# Patient Record
Sex: MALE | Race: WHITE | Employment: FULL TIME | URBAN - METROPOLITAN AREA
[De-identification: names, ages, dates, MRNs, and addresses within clinical notes are randomized per-mention and may not be internally consistent; named-entity substitution may affect disease eponyms.]

---

## 2017-02-11 ENCOUNTER — HOSPITAL ENCOUNTER (OUTPATIENT)
Age: 62
Discharge: HOME | End: 2017-02-11
Payer: COMMERCIAL

## 2017-02-11 LAB
ALBUMIN SERPL-MCNC: 4.2 G/DL (ref 3.5–5.2)
ALBUMIN/GLOBULIN RATIO: ABNORMAL (ref 1–2.5)
ALP BLD-CCNC: 60 U/L (ref 40–129)
ALT SERPL-CCNC: 21 U/L (ref 5–41)
ANION GAP SERPL CALCULATED.3IONS-SCNC: 15 MMOL/L (ref 9–17)
AST SERPL-CCNC: 16 U/L
BILIRUB SERPL-MCNC: 0.48 MG/DL (ref 0.3–1.2)
BILIRUBIN URINE: NEGATIVE
BUN BLDV-MCNC: 15 MG/DL (ref 6–20)
BUN/CREAT BLD: ABNORMAL (ref 9–20)
CALCIUM SERPL-MCNC: 9.3 MG/DL (ref 8.6–10.4)
CHLORIDE BLD-SCNC: 99 MMOL/L (ref 98–107)
CHOLESTEROL/HDL RATIO: 4.1
CHOLESTEROL: 176 MG/DL
CO2: 24 MMOL/L (ref 20–31)
COLOR: YELLOW
COMMENT UA: ABNORMAL
CREAT SERPL-MCNC: 0.65 MG/DL (ref 0.7–1.2)
CREATININE URINE: 315.9 MG/DL (ref 39–259)
GFR AFRICAN AMERICAN: >60 ML/MIN
GFR NON-AFRICAN AMERICAN: >60 ML/MIN
GFR SERPL CREATININE-BSD FRML MDRD: ABNORMAL ML/MIN/{1.73_M2}
GFR SERPL CREATININE-BSD FRML MDRD: ABNORMAL ML/MIN/{1.73_M2}
GLUCOSE BLD-MCNC: 245 MG/DL (ref 70–99)
GLUCOSE URINE: ABNORMAL
HCT VFR BLD CALC: 43.6 % (ref 41–53)
HDLC SERPL-MCNC: 43 MG/DL
HEMOGLOBIN: 14.8 G/DL (ref 13.5–17.5)
KETONES, URINE: NEGATIVE
LDL CHOLESTEROL: 99 MG/DL (ref 0–130)
LEUKOCYTE ESTERASE, URINE: NEGATIVE
MCH RBC QN AUTO: 29.6 PG (ref 26–34)
MCHC RBC AUTO-ENTMCNC: 34 G/DL (ref 31–37)
MCV RBC AUTO: 87.2 FL (ref 80–100)
MICROALBUMIN/CREAT 24H UR: 15 MG/L
MICROALBUMIN/CREAT UR-RTO: 5 MCG/MG CREAT
NITRITE, URINE: NEGATIVE
PDW BLD-RTO: 12.5 % (ref 11.5–14.9)
PH UA: 5 (ref 5–8)
PLATELET # BLD: 215 K/UL (ref 150–450)
PMV BLD AUTO: 7.6 FL (ref 6–12)
POTASSIUM SERPL-SCNC: 4 MMOL/L (ref 3.7–5.3)
PROTEIN UA: NEGATIVE
RBC # BLD: 5 M/UL (ref 4.5–5.9)
SODIUM BLD-SCNC: 138 MMOL/L (ref 135–144)
SPECIFIC GRAVITY UA: 1.04 (ref 1–1.03)
TOTAL PROTEIN: 7.2 G/DL (ref 6.4–8.3)
TRIGL SERPL-MCNC: 169 MG/DL
TURBIDITY: CLEAR
URINE HGB: NEGATIVE
UROBILINOGEN, URINE: NORMAL
VLDLC SERPL CALC-MCNC: ABNORMAL MG/DL (ref 1–30)
WBC # BLD: 6.6 K/UL (ref 3.5–11)

## 2017-02-11 PROCEDURE — 82570 ASSAY OF URINE CREATININE: CPT

## 2017-02-11 PROCEDURE — 81003 URINALYSIS AUTO W/O SCOPE: CPT

## 2017-02-11 PROCEDURE — 85027 COMPLETE CBC AUTOMATED: CPT

## 2017-02-11 PROCEDURE — 36415 COLL VENOUS BLD VENIPUNCTURE: CPT

## 2017-02-11 PROCEDURE — 80061 LIPID PANEL: CPT

## 2017-02-11 PROCEDURE — 80053 COMPREHEN METABOLIC PANEL: CPT

## 2017-02-11 PROCEDURE — 83036 HEMOGLOBIN GLYCOSYLATED A1C: CPT

## 2017-02-11 PROCEDURE — 82043 UR ALBUMIN QUANTITATIVE: CPT

## 2017-02-12 LAB
ESTIMATED AVERAGE GLUCOSE: 269 MG/DL
HBA1C MFR BLD: 11 % (ref 4–6)

## 2017-10-12 ENCOUNTER — OFFICE VISIT (OUTPATIENT)
Dept: PODIATRY | Age: 62
End: 2017-10-12
Payer: COMMERCIAL

## 2017-10-12 VITALS
WEIGHT: 242 LBS | DIASTOLIC BLOOD PRESSURE: 90 MMHG | HEART RATE: 64 BPM | BODY MASS INDEX: 34.65 KG/M2 | HEIGHT: 70 IN | SYSTOLIC BLOOD PRESSURE: 144 MMHG

## 2017-10-12 DIAGNOSIS — G57.81 NEUROMA DIGITAL NERVE, RIGHT: ICD-10-CM

## 2017-10-12 DIAGNOSIS — M77.51 CAPSULITIS OF METATARSOPHALANGEAL (MTP) JOINT OF RIGHT FOOT: Primary | ICD-10-CM

## 2017-10-12 DIAGNOSIS — R60.0 EDEMA OF RIGHT FOOT: ICD-10-CM

## 2017-10-12 DIAGNOSIS — M79.671 RIGHT FOOT PAIN: ICD-10-CM

## 2017-10-12 PROCEDURE — 73630 X-RAY EXAM OF FOOT: CPT | Performed by: PODIATRIST

## 2017-10-12 PROCEDURE — L3020 FOOT LONGITUD/METATARSAL SUP: HCPCS | Performed by: PODIATRIST

## 2017-10-12 PROCEDURE — 99213 OFFICE O/P EST LOW 20 MIN: CPT | Performed by: PODIATRIST

## 2017-10-12 RX ORDER — PROPRANOLOL HYDROCHLORIDE 120 MG/1
CAPSULE, EXTENDED RELEASE ORAL
COMMUNITY
Start: 2017-09-24

## 2017-10-12 ASSESSMENT — ENCOUNTER SYMPTOMS
NAUSEA: 0
COLOR CHANGE: 0
SHORTNESS OF BREATH: 0
BACK PAIN: 0
DIARRHEA: 0

## 2017-10-12 NOTE — PROGRESS NOTES
Subjective: Ifeanyi Hitchcock 63 y.o. male that presents with chief complaint of pain to the ball and toes of the right foot. Chief Complaint   Patient presents with    Foot Pain     B/L foot pain,     Other     Would like new orthotics/ casted for custom molded orthotics     Patient states that he had surgery on some hammertoes a few years ago and his foot was fine until the last few months. Patient states that he has orthotics, but they have not been fitting well and have been causing his feet to slide out. Patient states that they are also falling apart and he has had to use duct tape to fix them. Pain is rated 7 out of 10 and is described as intermittent. Patient also relates a little pain to the left midfoot, but denies any injury to this foot. Review of Systems   Constitutional: Negative for activity change, appetite change, chills, diaphoresis, fatigue and fever. Respiratory: Negative for shortness of breath. Cardiovascular: Negative for leg swelling. Gastrointestinal: Negative for diarrhea and nausea. Endocrine: Negative for cold intolerance, heat intolerance and polyuria. Musculoskeletal: Positive for arthralgias. Negative for back pain, gait problem, joint swelling and myalgias. Skin: Negative for color change, pallor, rash and wound. Allergic/Immunologic: Negative for environmental allergies and food allergies. Neurological: Negative for dizziness, weakness, light-headedness and numbness. Hematological: Does not bruise/bleed easily. Psychiatric/Behavioral: Negative for behavioral problems, confusion and self-injury. The patient is not nervous/anxious. Objective: Clinical evaluation of the patient reveals pain with palpation to the second, third, and fourth metatarsophalangeal joints of the right foot. This pain is greatest to the third metatarsophalangeal joint. There is pain with palpation to the second and third intermetatarsal spaces of the right foot.   There is no palpable click noted to either intermetatarsal space. There is mild divergence of the second and third toes of the right foot. There is mild edema present to the ball of the right foot. There is no erythema, calor, or open lesion noted to the right foot. X-ray's taken: AP, Lateral, and Medial Oblique of the right foot. Findings: There is no fracture or stress fracture noted to the foot. Assessment:   1. Capsulitis of metatarsophalangeal (MTP) joint of right foot  XR FOOT RIGHT (MIN 3 VIEWS)    NC FOOT LONGITUD/METATARSAL SUP   2. Neuroma digital nerve, right  XR FOOT RIGHT (MIN 3 VIEWS)    NC FOOT LONGITUD/METATARSAL SUP   3. Edema of right foot  XR FOOT RIGHT (MIN 3 VIEWS)    NC FOOT LONGITUD/METATARSAL SUP   4. Right foot pain  XR FOOT RIGHT (MIN 3 VIEWS)    NC FOOT LONGITUD/METATARSAL SUP         Plan: 1. Clinical evaluation of the patient. 2.  Patient casted today for custom molded orthotics. I recommended a steroid injection to the patient's right foot, but the patient declines at this time. Therefore, the patient was encouraged to use compression and ice to the foot. 3. Return if symptoms worsen or fail to improve, for 2 weeks after getting custom orthotics.    10/12/2017      Emerita Boudreaux DPM

## 2017-11-15 ENCOUNTER — TELEPHONE (OUTPATIENT)
Dept: PODIATRY | Age: 62
End: 2017-11-15

## 2017-11-17 ENCOUNTER — NURSE ONLY (OUTPATIENT)
Dept: PODIATRY | Age: 62
End: 2017-11-17

## 2017-11-17 DIAGNOSIS — G57.81 NEUROMA DIGITAL NERVE, RIGHT: ICD-10-CM

## 2017-11-17 DIAGNOSIS — M79.671 RIGHT FOOT PAIN: ICD-10-CM

## 2017-11-17 DIAGNOSIS — M77.51 CAPSULITIS OF METATARSOPHALANGEAL (MTP) JOINT OF RIGHT FOOT: Primary | ICD-10-CM

## 2017-11-17 DIAGNOSIS — R60.0 EDEMA OF RIGHT FOOT: ICD-10-CM

## 2018-11-29 ENCOUNTER — HOSPITAL ENCOUNTER (OUTPATIENT)
Age: 63
Discharge: HOME | End: 2018-11-29
Payer: COMMERCIAL

## 2018-11-29 LAB
ANION GAP SERPL CALCULATED.3IONS-SCNC: 12 MMOL/L (ref 9–17)
BUN BLDV-MCNC: 17 MG/DL (ref 8–23)
BUN/CREAT BLD: ABNORMAL (ref 9–20)
CALCIUM SERPL-MCNC: 9.3 MG/DL (ref 8.6–10.4)
CHLORIDE BLD-SCNC: 98 MMOL/L (ref 98–107)
CO2: 25 MMOL/L (ref 20–31)
CREAT SERPL-MCNC: 0.74 MG/DL (ref 0.7–1.2)
GFR AFRICAN AMERICAN: >60 ML/MIN
GFR NON-AFRICAN AMERICAN: >60 ML/MIN
GFR SERPL CREATININE-BSD FRML MDRD: ABNORMAL ML/MIN/{1.73_M2}
GFR SERPL CREATININE-BSD FRML MDRD: ABNORMAL ML/MIN/{1.73_M2}
GLUCOSE BLD-MCNC: 218 MG/DL (ref 70–99)
POTASSIUM SERPL-SCNC: 4.1 MMOL/L (ref 3.7–5.3)
SODIUM BLD-SCNC: 135 MMOL/L (ref 135–144)

## 2018-11-29 PROCEDURE — 80048 BASIC METABOLIC PNL TOTAL CA: CPT

## 2018-11-29 PROCEDURE — 36415 COLL VENOUS BLD VENIPUNCTURE: CPT

## 2019-10-12 ENCOUNTER — HOSPITAL ENCOUNTER (OUTPATIENT)
Age: 64
Discharge: HOME | End: 2019-10-12
Payer: COMMERCIAL

## 2019-10-12 ENCOUNTER — HOSPITAL ENCOUNTER (OUTPATIENT)
Age: 64
Discharge: HOME | End: 2019-10-14
Payer: COMMERCIAL

## 2019-10-12 ENCOUNTER — HOSPITAL ENCOUNTER (OUTPATIENT)
Dept: GENERAL RADIOLOGY | Age: 64
Discharge: HOME | End: 2019-10-14
Payer: COMMERCIAL

## 2019-10-12 DIAGNOSIS — M25.569 KNEE PAIN, UNSPECIFIED CHRONICITY, UNSPECIFIED LATERALITY: ICD-10-CM

## 2019-10-12 LAB
ANION GAP SERPL CALCULATED.3IONS-SCNC: 12 MMOL/L (ref 9–17)
BUN BLDV-MCNC: 21 MG/DL (ref 8–23)
BUN/CREAT BLD: ABNORMAL (ref 9–20)
CALCIUM SERPL-MCNC: 9.9 MG/DL (ref 8.6–10.4)
CHLORIDE BLD-SCNC: 101 MMOL/L (ref 98–107)
CO2: 28 MMOL/L (ref 20–31)
CREAT SERPL-MCNC: 0.82 MG/DL (ref 0.7–1.2)
GFR AFRICAN AMERICAN: >60 ML/MIN
GFR NON-AFRICAN AMERICAN: >60 ML/MIN
GFR SERPL CREATININE-BSD FRML MDRD: ABNORMAL ML/MIN/{1.73_M2}
GFR SERPL CREATININE-BSD FRML MDRD: ABNORMAL ML/MIN/{1.73_M2}
GLUCOSE BLD-MCNC: 162 MG/DL (ref 70–99)
POTASSIUM SERPL-SCNC: 4.5 MMOL/L (ref 3.7–5.3)
SODIUM BLD-SCNC: 141 MMOL/L (ref 135–144)

## 2019-10-12 PROCEDURE — 73562 X-RAY EXAM OF KNEE 3: CPT

## 2019-10-12 PROCEDURE — 36415 COLL VENOUS BLD VENIPUNCTURE: CPT

## 2019-10-12 PROCEDURE — 80048 BASIC METABOLIC PNL TOTAL CA: CPT

## 2022-02-16 ENCOUNTER — HOSPITAL ENCOUNTER (OUTPATIENT)
Age: 67
Discharge: HOME | End: 2022-02-16
Payer: COMMERCIAL

## 2022-02-16 LAB
ANION GAP SERPL CALCULATED.3IONS-SCNC: 11 MMOL/L (ref 9–17)
BUN BLDV-MCNC: 21 MG/DL (ref 8–23)
BUN/CREAT BLD: ABNORMAL (ref 9–20)
CALCIUM SERPL-MCNC: 9.7 MG/DL (ref 8.6–10.4)
CHLORIDE BLD-SCNC: 100 MMOL/L (ref 98–107)
CO2: 26 MMOL/L (ref 20–31)
CREAT SERPL-MCNC: 0.9 MG/DL (ref 0.7–1.2)
GFR AFRICAN AMERICAN: >60 ML/MIN
GFR NON-AFRICAN AMERICAN: >60 ML/MIN
GFR SERPL CREATININE-BSD FRML MDRD: ABNORMAL ML/MIN/{1.73_M2}
GFR SERPL CREATININE-BSD FRML MDRD: ABNORMAL ML/MIN/{1.73_M2}
GLUCOSE BLD-MCNC: 266 MG/DL (ref 70–99)
POTASSIUM SERPL-SCNC: 4.1 MMOL/L (ref 3.7–5.3)
SODIUM BLD-SCNC: 137 MMOL/L (ref 135–144)

## 2022-02-16 PROCEDURE — 36415 COLL VENOUS BLD VENIPUNCTURE: CPT

## 2022-02-16 PROCEDURE — 80048 BASIC METABOLIC PNL TOTAL CA: CPT

## 2023-10-31 ENCOUNTER — OFFICE VISIT (OUTPATIENT)
Age: 68
End: 2023-10-31
Payer: COMMERCIAL

## 2023-10-31 VITALS
HEART RATE: 66 BPM | BODY MASS INDEX: 37.81 KG/M2 | SYSTOLIC BLOOD PRESSURE: 146 MMHG | HEIGHT: 70 IN | DIASTOLIC BLOOD PRESSURE: 76 MMHG | WEIGHT: 264.1 LBS

## 2023-10-31 DIAGNOSIS — M48.062 LUMBAR STENOSIS WITH NEUROGENIC CLAUDICATION: Primary | ICD-10-CM

## 2023-10-31 DIAGNOSIS — M71.38 SYNOVIAL CYST OF LUMBAR SPINE: ICD-10-CM

## 2023-10-31 PROCEDURE — 99214 OFFICE O/P EST MOD 30 MIN: CPT | Performed by: NEUROLOGICAL SURGERY

## 2023-10-31 PROCEDURE — 3077F SYST BP >= 140 MM HG: CPT | Performed by: NEUROLOGICAL SURGERY

## 2023-10-31 PROCEDURE — G8427 DOCREV CUR MEDS BY ELIG CLIN: HCPCS | Performed by: NEUROLOGICAL SURGERY

## 2023-10-31 PROCEDURE — 3078F DIAST BP <80 MM HG: CPT | Performed by: NEUROLOGICAL SURGERY

## 2023-10-31 PROCEDURE — 3017F COLORECTAL CA SCREEN DOC REV: CPT | Performed by: NEUROLOGICAL SURGERY

## 2023-10-31 PROCEDURE — G8417 CALC BMI ABV UP PARAM F/U: HCPCS | Performed by: NEUROLOGICAL SURGERY

## 2023-10-31 PROCEDURE — 1036F TOBACCO NON-USER: CPT | Performed by: NEUROLOGICAL SURGERY

## 2023-10-31 PROCEDURE — 1123F ACP DISCUSS/DSCN MKR DOCD: CPT | Performed by: NEUROLOGICAL SURGERY

## 2023-10-31 PROCEDURE — G8484 FLU IMMUNIZE NO ADMIN: HCPCS | Performed by: NEUROLOGICAL SURGERY

## 2023-10-31 RX ORDER — GLIPIZIDE 5 MG/1
TABLET, FILM COATED, EXTENDED RELEASE ORAL
COMMUNITY

## 2023-10-31 RX ORDER — GABAPENTIN 100 MG/1
CAPSULE ORAL
COMMUNITY
Start: 2023-10-25

## 2023-10-31 RX ORDER — LOSARTAN POTASSIUM 100 MG/1
1 TABLET ORAL DAILY
COMMUNITY

## 2023-10-31 RX ORDER — OXYCODONE HYDROCHLORIDE AND ACETAMINOPHEN 5; 325 MG/1; MG/1
TABLET ORAL
COMMUNITY
Start: 2023-10-23

## 2023-10-31 RX ORDER — ASPIRIN 325 MG/1
TABLET, FILM COATED ORAL
COMMUNITY
Start: 2023-04-10

## 2023-10-31 RX ORDER — PIOGLITAZONEHYDROCHLORIDE 30 MG/1
1 TABLET ORAL
COMMUNITY
Start: 2022-07-07

## 2023-10-31 RX ORDER — MAGNESIUM 30 MG
30 TABLET ORAL DAILY PRN
COMMUNITY

## 2023-10-31 NOTE — PROGRESS NOTES
[facility] NEUROSURGERY  [address] [place] [address]  Dept: [phone]  Dept Fax: [phone]     Patient:  Odilon Braswell  YOB: 1955  Date: 10/31/23      Chief Complaint   Patient presents with    Back Pain     Radiating into legs; review MRI lumbar           HPI:     Mr. Nancy Watt returns to the office today for further evaluation of lower back pain that radiates in the lower extremities. I had originally seen him in August 2021 as a new patient. At that time he had been sent in for evaluation after having an episode of pain and having imaging that demonstrated significant degenerative findings. By the time of his appointment in the office, however, his symptoms had essentially resolved. At that time we did not pursue any surgical treatment. He continued to do fairly well until this past August.  He had a bit of a flareup of pain in the back that settled down after a few weeks, but then became worse again in September. He has been having fairly persistent pain for about 1 month now. He feels that the pain was worse at onset, and that it has improved some now, but he has not been up and moving very much. It sounds as if he spends most of the day sitting in a chair. He has not yet had any physical therapy since this new episode began. He did try an injection at his primary care provider's office, which I suspect was probably a steroid. He has also been prescribed gabapentin and Percocet. The pain tends to be worse with standing and walking and relieved with rest.  He describes the pain starting in the lower back and radiating around into the thighs. Also he will have difficulty with pain around the tailbone. He has not noticed any difficulty with bowel or bladder dysfunction.         Physical Exam:      BP (!) 146/76   Pulse 66   Ht 1.778 m (5' 10\")   Wt 119.8 kg (264 lb

## 2023-11-01 ENCOUNTER — TELEPHONE (OUTPATIENT)
Age: 68
End: 2023-11-01

## 2023-11-01 NOTE — TELEPHONE ENCOUNTER
You saw pt 10/31/2023 and you referred him to physical therapy. He decided he no longer want to try PT, he wants to schedule surgery. If this is ok you'll need to do an addendum to our office note stating what surgery you would be doing.

## 2023-11-02 NOTE — TELEPHONE ENCOUNTER
I agree that surgery is a good option for him, but we have had a couple of patients have surgery for this reason denied or delayed by their insurance company because they had not had recent physical therapy.  I think he should go ahead with the PT and we can plan to see him in a few weeks to check his progress.  If he is not responding to therapy we will move ahead with surgery at that time, and this is likely the most expeditious way to get him scheduled.

## 2023-11-07 ENCOUNTER — HOSPITAL ENCOUNTER (OUTPATIENT)
Dept: PHYSICAL THERAPY | Age: 68
Setting detail: THERAPIES SERIES
Discharge: HOME | End: 2023-11-07
Attending: NEUROLOGICAL SURGERY
Payer: COMMERCIAL

## 2023-11-07 PROCEDURE — 97162 PT EVAL MOD COMPLEX 30 MIN: CPT

## 2023-11-07 PROCEDURE — 97110 THERAPEUTIC EXERCISES: CPT

## 2023-11-07 PROCEDURE — 97163 PT EVAL HIGH COMPLEX 45 MIN: CPT

## 2023-11-09 ENCOUNTER — HOSPITAL ENCOUNTER (OUTPATIENT)
Dept: PHYSICAL THERAPY | Age: 68
Setting detail: THERAPIES SERIES
Discharge: HOME | End: 2023-11-09
Attending: NEUROLOGICAL SURGERY
Payer: COMMERCIAL

## 2023-11-09 PROCEDURE — 97113 AQUATIC THERAPY/EXERCISES: CPT

## 2023-11-09 NOTE — FLOWSHEET NOTE
[x] [facility] ([facility]) - [facility] & Therapy  [address]  [place]: [phone]   F: [phone]    Physical Therapy Daily Treatment Note    Date:  2023  Patient Name:  Arlette Barbosa    :    MRN: [id number]  Physician: Dipak Herrera MD                      Insurance: [id number] DUARTE ( visits remaining)  Medical Diagnosis: A72.825 (ICD-10-CM) - Lumbar stenosis with neurogenic claudication      Rehab Codes: R25 pain , M25.60 stiffness, R53.1 weakness   Onset Date: 23                 Next 's appt: 23-neurosurgery  Visit# / total visits: 2/10  Cancels/No Shows: 0/0    Subjective:  Pt reports increased pain this morning. States tailbone is the most painful in R buttocks and pain in Right lateral knee. Notes today is a high pain day. Was feeling better earlier this week and woke up in pain. Pain:  [x] Yes  [] No Location: tailbone and into R buttocks and at right knee   Pain Rating: (0-10 scale) 8-9/10  Pain altered Tx:  [x] No  [] Yes  Action:  Comments:  Initial aquatic therapy visit. Educated on postural awareness, core stability and working in pain free ranges to avoid aggravating pain. Objective:    [address] [employer]  Aquatic, Hip & DLS Program- Phase 1    Date of Eval: 23                               Primary PT: Trae Mattson, PT      Date 23       Visit # 2/10       Walk F/L/R 2 Laps       Marching 10x       Squats NT       Step-Ups F/L        Step Down F/L        Heel-toe raises 10x       SLR F/L/R 10x       Hip/Knee Flex/Ext        F/L Lunges        HS curl Chest deep 10x               Kickboard Ex.  SM       Iso Abd. 10x5\"       Push-pull 10x       Paddling                UE Format:        Horiz Abd/Add        IR/ER (wipers)        Alt Flex/Ext        Alt Press Down        Abd/Add                [place]:        [id number] [facility]        X-Country

## 2023-11-13 ENCOUNTER — HOSPITAL ENCOUNTER (OUTPATIENT)
Dept: PHYSICAL THERAPY | Age: 68
Setting detail: THERAPIES SERIES
Discharge: HOME | End: 2023-11-13
Attending: NEUROLOGICAL SURGERY
Payer: MEDICARE

## 2023-11-13 PROCEDURE — 97113 AQUATIC THERAPY/EXERCISES: CPT

## 2023-11-13 NOTE — FLOWSHEET NOTE
Abd/Add                Deep Water:  1 Noodle      Hang  attempted      Cycling        Jacks        X-Country                Balance        SLS                Stretches        Achllies Attempted 2x15\"      Hamstring                Cool Down  2 Laps      Pain Rating 8-9 5         Specific Instructions for next treatment:  add UE format next visit. Assessment: [x] Progressing toward goals. Better tolerance to therapy today. R knee more limiting due to arthritis pain. Able to complete all exercises in pain free ranges without causing spasms today. Did not perform squats to highly irritating during first visit. Also tried deep water hang to further unload spine but caused spasms in lumbar back after 30\" of hanging due to needing to stabilize against buoyancy of LEs wanting to float. [] No change. [] Other:     [x] Patient would continue to benefit from skilled physical therapy services in order to: reduce his pain, reduce his tightness/stiffness in his lumbar region and in B LE, increase his B LE strength, increase his sleep quality, and increase his ability to function independently in the community and at home    STG: (to be met in 5 treatments)  Pt will self report worst pain no greater than 6/10 in lumbar spine in order to better tolerate ADLs with minimal dysfunction  Pt will improve AROM in lumbar spine to at least 50% in all directions in order to demonstrate ability to move/reach in all planes unrestricted at PLOF  Pt to be able to tolerate standing for 5 minutes without increase in LBP/B LE pain in order to improve ability to function throughout the community and at home. Pt to improve modified YAKOV score from 62% to 35% in order to improve quality of life and improve ability to participate in housekeeping activities and other activities throughout the community.   Pt to show no aberrant posturing/movement with lumbar ROM in order to improve overall body mechanics and decrease risk for

## 2023-11-14 ENCOUNTER — HOSPITAL ENCOUNTER (INPATIENT)
Age: 68
LOS: 3 days | Discharge: HOME HEALTH | End: 2023-11-17
Attending: EMERGENCY MEDICINE | Admitting: INTERNAL MEDICINE
Payer: COMMERCIAL

## 2023-11-14 ENCOUNTER — APPOINTMENT (OUTPATIENT)
Dept: GENERAL RADIOLOGY | Age: 68
End: 2023-11-14
Payer: COMMERCIAL

## 2023-11-14 DIAGNOSIS — M00.9 PYOGENIC ARTHRITIS OF RIGHT KNEE JOINT, DUE TO UNSPECIFIED ORGANISM (HCC): Primary | ICD-10-CM

## 2023-11-14 LAB
ANION GAP SERPL CALCULATED.3IONS-SCNC: 14 MMOL/L (ref 9–17)
APPEARANCE FLD: NORMAL
BASOPHILS # BLD: 0.1 K/UL (ref 0–0.2)
BASOPHILS NFR BLD: 1 % (ref 0–2)
BLASTS NFR FLD: ABNORMAL %
BODY FLD TYPE: NORMAL
BUN SERPL-MCNC: 19 MG/DL (ref 8–23)
CALCIUM SERPL-MCNC: 9.8 MG/DL (ref 8.6–10.4)
CHLORIDE SERPL-SCNC: 95 MMOL/L (ref 98–107)
CO2 SERPL-SCNC: 29 MMOL/L (ref 20–31)
COLOR FLD: NORMAL
CREAT SERPL-MCNC: 0.9 MG/DL (ref 0.7–1.2)
CRP SERPL HS-MCNC: 13.7 MG/L (ref 0–5)
CRYSTALS FLD MICRO: NORMAL
EOSINOPHIL # BLD: 0.2 K/UL (ref 0–0.4)
EOSINOPHIL NFR FLD: ABNORMAL %
EOSINOPHILS RELATIVE PERCENT: 2 % (ref 0–4)
ERYTHROCYTE [DISTWIDTH] IN BLOOD BY AUTOMATED COUNT: 13.6 % (ref 11.5–14.9)
ERYTHROCYTE [SEDIMENTATION RATE] IN BLOOD BY PHOTOMETRIC METHOD: 40 MM/HR (ref 0–20)
GFR SERPL CREATININE-BSD FRML MDRD: >60 ML/MIN/1.73M2
GLUCOSE BLD-MCNC: 127 MG/DL (ref 75–110)
GLUCOSE SERPL-MCNC: 144 MG/DL (ref 70–99)
HCT VFR BLD AUTO: 36.7 % (ref 41–53)
HGB BLD-MCNC: 12.3 G/DL (ref 13.5–17.5)
LYMPHOCYTES NFR BLD: 1.5 K/UL (ref 1–4.8)
LYMPHOCYTES NFR FLD: ABNORMAL %
LYMPHOCYTES RELATIVE PERCENT: 16 % (ref 24–44)
MCH RBC QN AUTO: 30.5 PG (ref 26–34)
MCHC RBC AUTO-ENTMCNC: 33.6 G/DL (ref 31–37)
MCV RBC AUTO: 90.7 FL (ref 80–100)
MONOCYTES NFR BLD: 0.9 K/UL (ref 0.1–1.3)
MONOCYTES NFR BLD: 10 % (ref 1–7)
MONOCYTES NFR FLD: 1 %
NEUTROPHILS NFR BLD: 71 % (ref 36–66)
NEUTROPHILS NFR FLD: 99 %
NEUTS SEG NFR BLD: 6.6 K/UL (ref 1.3–9.1)
PLATELET # BLD AUTO: 232 K/UL (ref 150–450)
PMV BLD AUTO: 7 FL (ref 6–12)
POTASSIUM SERPL-SCNC: 4.4 MMOL/L (ref 3.7–5.3)
RBC # BLD AUTO: 4.04 M/UL (ref 4.5–5.9)
RBC # FLD: NORMAL CELLS/UL
SODIUM SERPL-SCNC: 138 MMOL/L (ref 135–144)
SPECIMEN TYPE: NORMAL
UNIDENT CELLS NFR FLD: ABNORMAL %
WBC # FLD: 6619 CELLS/UL
WBC OTHER # BLD: 9.3 K/UL (ref 3.5–11)

## 2023-11-14 PROCEDURE — 20610 DRAIN/INJ JOINT/BURSA W/O US: CPT

## 2023-11-14 PROCEDURE — 87070 CULTURE OTHR SPECIMN AEROBIC: CPT

## 2023-11-14 PROCEDURE — 82947 ASSAY GLUCOSE BLOOD QUANT: CPT

## 2023-11-14 PROCEDURE — 6360000002 HC RX W HCPCS: Performed by: PHYSICIAN ASSISTANT

## 2023-11-14 PROCEDURE — 2500000003 HC RX 250 WO HCPCS: Performed by: PHYSICIAN ASSISTANT

## 2023-11-14 PROCEDURE — 99285 EMERGENCY DEPT VISIT HI MDM: CPT

## 2023-11-14 PROCEDURE — 85025 COMPLETE CBC W/AUTO DIFF WBC: CPT

## 2023-11-14 PROCEDURE — 73562 X-RAY EXAM OF KNEE 3: CPT

## 2023-11-14 PROCEDURE — 87205 SMEAR GRAM STAIN: CPT

## 2023-11-14 PROCEDURE — 36415 COLL VENOUS BLD VENIPUNCTURE: CPT

## 2023-11-14 PROCEDURE — 99223 1ST HOSP IP/OBS HIGH 75: CPT | Performed by: INTERNAL MEDICINE

## 2023-11-14 PROCEDURE — 89060 EXAM SYNOVIAL FLUID CRYSTALS: CPT

## 2023-11-14 PROCEDURE — 87075 CULTR BACTERIA EXCEPT BLOOD: CPT

## 2023-11-14 PROCEDURE — 85652 RBC SED RATE AUTOMATED: CPT

## 2023-11-14 PROCEDURE — 2580000003 HC RX 258: Performed by: PHYSICIAN ASSISTANT

## 2023-11-14 PROCEDURE — 86140 C-REACTIVE PROTEIN: CPT

## 2023-11-14 PROCEDURE — 6370000000 HC RX 637 (ALT 250 FOR IP): Performed by: INTERNAL MEDICINE

## 2023-11-14 PROCEDURE — 80048 BASIC METABOLIC PNL TOTAL CA: CPT

## 2023-11-14 PROCEDURE — 96372 THER/PROPH/DIAG INJ SC/IM: CPT

## 2023-11-14 PROCEDURE — 1200000000 HC SEMI PRIVATE

## 2023-11-14 RX ORDER — INSULIN LISPRO 100 [IU]/ML
0-4 INJECTION, SOLUTION INTRAVENOUS; SUBCUTANEOUS NIGHTLY
Status: DISCONTINUED | OUTPATIENT
Start: 2023-11-14 | End: 2023-11-17 | Stop reason: HOSPADM

## 2023-11-14 RX ORDER — ATORVASTATIN CALCIUM 40 MG/1
40 TABLET, FILM COATED ORAL DAILY
Status: ON HOLD | COMMUNITY
End: 2023-11-17 | Stop reason: HOSPADM

## 2023-11-14 RX ORDER — PROPRANOLOL HCL 60 MG
120 CAPSULE, EXTENDED RELEASE 24HR ORAL DAILY
Status: DISCONTINUED | OUTPATIENT
Start: 2023-11-14 | End: 2023-11-17 | Stop reason: HOSPADM

## 2023-11-14 RX ORDER — LOSARTAN POTASSIUM 50 MG/1
100 TABLET ORAL DAILY
Status: DISCONTINUED | OUTPATIENT
Start: 2023-11-14 | End: 2023-11-17 | Stop reason: HOSPADM

## 2023-11-14 RX ORDER — ATORVASTATIN CALCIUM 40 MG/1
40 TABLET, FILM COATED ORAL NIGHTLY
Status: DISCONTINUED | OUTPATIENT
Start: 2023-11-14 | End: 2023-11-17 | Stop reason: HOSPADM

## 2023-11-14 RX ORDER — GABAPENTIN 100 MG/1
100 CAPSULE ORAL 2 TIMES DAILY
Status: DISCONTINUED | OUTPATIENT
Start: 2023-11-14 | End: 2023-11-17 | Stop reason: HOSPADM

## 2023-11-14 RX ORDER — KETOROLAC TROMETHAMINE 30 MG/ML
30 INJECTION, SOLUTION INTRAMUSCULAR; INTRAVENOUS ONCE
Status: COMPLETED | OUTPATIENT
Start: 2023-11-14 | End: 2023-11-14

## 2023-11-14 RX ORDER — 0.9 % SODIUM CHLORIDE 0.9 %
1000 INTRAVENOUS SOLUTION INTRAVENOUS ONCE
Status: COMPLETED | OUTPATIENT
Start: 2023-11-14 | End: 2023-11-14

## 2023-11-14 RX ORDER — FENTANYL CITRATE 0.05 MG/ML
50 INJECTION, SOLUTION INTRAMUSCULAR; INTRAVENOUS ONCE
Status: COMPLETED | OUTPATIENT
Start: 2023-11-14 | End: 2023-11-14

## 2023-11-14 RX ORDER — HYDROCHLOROTHIAZIDE 25 MG/1
25 TABLET ORAL DAILY
Status: DISCONTINUED | OUTPATIENT
Start: 2023-11-14 | End: 2023-11-17 | Stop reason: HOSPADM

## 2023-11-14 RX ORDER — OXYCODONE HYDROCHLORIDE AND ACETAMINOPHEN 5; 325 MG/1; MG/1
1 TABLET ORAL EVERY 6 HOURS PRN
Status: DISCONTINUED | OUTPATIENT
Start: 2023-11-14 | End: 2023-11-17 | Stop reason: HOSPADM

## 2023-11-14 RX ORDER — MORPHINE SULFATE 4 MG/ML
2 INJECTION, SOLUTION INTRAMUSCULAR; INTRAVENOUS
Status: DISCONTINUED | OUTPATIENT
Start: 2023-11-14 | End: 2023-11-17 | Stop reason: HOSPADM

## 2023-11-14 RX ORDER — AMOXICILLIN AND CLAVULANATE POTASSIUM 875; 125 MG/1; MG/1
1 TABLET, FILM COATED ORAL 2 TIMES DAILY
Status: ON HOLD | COMMUNITY
Start: 2023-11-10 | End: 2023-11-17 | Stop reason: HOSPADM

## 2023-11-14 RX ORDER — LIDOCAINE HYDROCHLORIDE 10 MG/ML
5 INJECTION, SOLUTION INFILTRATION; PERINEURAL ONCE
Status: COMPLETED | OUTPATIENT
Start: 2023-11-14 | End: 2023-11-14

## 2023-11-14 RX ORDER — BENZONATATE 200 MG/1
200 CAPSULE ORAL 3 TIMES DAILY PRN
COMMUNITY
Start: 2023-11-10 | End: 2023-11-20

## 2023-11-14 RX ORDER — INSULIN LISPRO 100 [IU]/ML
0-8 INJECTION, SOLUTION INTRAVENOUS; SUBCUTANEOUS
Status: DISCONTINUED | OUTPATIENT
Start: 2023-11-14 | End: 2023-11-17 | Stop reason: HOSPADM

## 2023-11-14 RX ADMIN — GABAPENTIN 100 MG: 100 CAPSULE ORAL at 21:57

## 2023-11-14 RX ADMIN — SODIUM CHLORIDE 1000 ML: 9 INJECTION, SOLUTION INTRAVENOUS at 16:36

## 2023-11-14 RX ADMIN — KETOROLAC TROMETHAMINE 30 MG: 30 INJECTION, SOLUTION INTRAMUSCULAR; INTRAVENOUS at 12:00

## 2023-11-14 RX ADMIN — FENTANYL CITRATE 50 MCG: 0.05 INJECTION, SOLUTION INTRAMUSCULAR; INTRAVENOUS at 11:59

## 2023-11-14 RX ADMIN — CEFEPIME 2000 MG: 2 INJECTION, POWDER, FOR SOLUTION INTRAVENOUS at 16:37

## 2023-11-14 RX ADMIN — VANCOMYCIN HYDROCHLORIDE 2500 MG: 1 INJECTION, POWDER, LYOPHILIZED, FOR SOLUTION INTRAVENOUS at 17:29

## 2023-11-14 RX ADMIN — LIDOCAINE HYDROCHLORIDE 5 ML: 10 INJECTION, SOLUTION INFILTRATION; PERINEURAL at 13:16

## 2023-11-14 RX ADMIN — ATORVASTATIN CALCIUM 40 MG: 40 TABLET, FILM COATED ORAL at 21:57

## 2023-11-14 RX ADMIN — HYDROCHLOROTHIAZIDE 25 MG: 25 TABLET ORAL at 22:04

## 2023-11-14 ASSESSMENT — PAIN SCALES - GENERAL
PAINLEVEL_OUTOF10: 10
PAINLEVEL_OUTOF10: 0

## 2023-11-14 NOTE — ED NOTES
Writer was notified by PA to notify lab that [facility] specimen needs to be changed to synovial fluid.  Lab notified      Mason Guadarrama LPN  [id number]/[phone]

## 2023-11-14 NOTE — PROGRESS NOTES
Patient not seen but chart reviewed  With his knee aspiration, 6600 WBCs and 99%polys, as well as elevated ESR and CRP, and no crystals, likely to be septic arthritis. Have taken to the liberty to tenatively put patient on schedule tomorrow for arthroscopic I&D of knee.  NPO at midnight

## 2023-11-14 NOTE — PROGRESS NOTES
Pharmacy Medication History Note      List of current medications patient is taking is complete. Source of information: dispense report, OARRS    Changes made to medication list:  Medications flagged for removal (include reason, ex. noncompliance):  Simvastatin - patient has switched to atorvastatin     Medications removed (include reason, ex. therapy complete or physician discontinued):  Duplicate metformin    Medications added/doses adjusted:  Augmentin 875-125 mg twice daily for 10 days  Atorvastatin 40 mg daily   Benzonatate 200 mg three times daily as needed for 10 days    Other notes (ex. Recent course of antibiotics, Coumadin dosing):  Per OARRS, last fill of gabapentin was on 10/25/23 with quantity 120 for 30. Per OARRS, last fill of Percocet was on 10/27/23 with quantity 30 for 30 days. Denies use of other OTC or herbal medications.       Allergies clarified    Medication list provided to the patient: no  Medication education provided to the patient: none      Electronically signed by Alban Casas [facility] on 11/14/2023 at 5:59 PM

## 2023-11-14 NOTE — ED PROVIDER NOTES
[address] [facility],6[address] Floor ED  eMERGENCY dEPARTMENT eNCOUnter   Independent Attestation     Pt Name: Yesica Kim  MRN: [id number]  [address] [facility] 1955  Date of evaluation: 11/14/23       Yesica Kim is a 79 y.o. male who presents with Joint Swelling (Pt c/o right knee pain 2-3 days ago. Pt is currently on ATB for strept.)        Based on the medical record, the care appears appropriate. I was personally available for consultation in the Emergency Department.     Mati Giordano MD  Attending Emergency  Physician               Mati Giordano MD  11/14/23 [id number]

## 2023-11-14 NOTE — PROGRESS NOTES
Vancomycin Dosing by Pharmacy - Initial Note   TODAY'S DATE:  11/14/2023  Patient name, age:  Hope Thao, 79 y.o. Indication: Bone and joint infection  Additional antimicrobials:  cefepime    Allergies:  Patient has no known allergies. Actual Weight:    Wt Readings from Last 1 Encounters:   11/14/23 106.6 kg (235 lb)     Labs/Ancillary Data  Estimated Creatinine Clearance: 99 mL/min (based on SCr of 0.9 mg/dL). Recent Labs     11/14/23  1150 11/14/23  1237   CREATININE 0.9  --    BUN 19  --    WBC  --  9.3     No results found for: \"PROCAL\"  No intake or output data in the 24 hours ending 11/14/23 1638  Temp: 98.4    Recent vancomycin administrations        No vancomycin IV orders with administrations found. Orders not given:            vancomycin (VANCOCIN) intermittent dosing (placeholder)    vancomycin (VANCOCIN) 2,500 mg in sodium chloride 0.9 % 500 mL IVPB    vancomycin (VANCOCIN) 1,000 mg in sodium chloride 0.9 % 250 mL IVPB ([id number])                  Culture Date / Judeen Anger  /  Results  See micro    MRSA Nasal Swab: N/A. Non-respiratory infection. [facility] PLAN   Initial loading dose of 25mg/kg (max of 2,500 mg) = 2500  mg  x 1, then  vancomycin 1000 mg IV every 12 hours. Ensured BUN/sCr ordered at baseline and every 48 hours x at least 3 levels, then at least weekly. Vancomycin level ordered for 11/16 @ 0600. Will use bayesian method for dosing. This level will not be a trough. Target AUC/SIMON: 400-600.       Vancomycin Target Concentration Parameters  Treatment  Population Target AUC/SIMON Target Trough   Invasive MRSA Infection (bacteremia, pneumonia, meningitis, endocarditis, osteomyelitis)  Sepsis (undifferentiated) 400-600 N/A   Infection due to non-MRSA pathogen  Empiric treatment of non-invasive MRSA infection  (SSTI, UTI) <500 10-15 mg/L   CrCl < 29 mL/min  Rapidly fluctuating serum creatinine   JÚNIOR N/A < 15 mg/L     Renal replacement therapy is dosed by levels, per hospital

## 2023-11-15 ENCOUNTER — CLINICAL DOCUMENTATION (OUTPATIENT)
Dept: PHYSICAL THERAPY | Age: 68
End: 2023-11-15

## 2023-11-15 ENCOUNTER — ANESTHESIA EVENT (OUTPATIENT)
Dept: OPERATING ROOM | Age: 68
End: 2023-11-15
Payer: COMMERCIAL

## 2023-11-15 ENCOUNTER — ANESTHESIA (OUTPATIENT)
Dept: OPERATING ROOM | Age: 68
End: 2023-11-15
Payer: COMMERCIAL

## 2023-11-15 PROBLEM — S83.241A ACUTE MEDIAL MENISCUS TEAR OF RIGHT KNEE: Status: ACTIVE | Noted: 2023-11-15

## 2023-11-15 LAB
ANION GAP SERPL CALCULATED.3IONS-SCNC: 11 MMOL/L (ref 9–17)
BASOPHILS # BLD: 0.1 K/UL (ref 0–0.2)
BASOPHILS NFR BLD: 1 % (ref 0–2)
BUN SERPL-MCNC: 23 MG/DL (ref 8–23)
CALCIUM SERPL-MCNC: 9.3 MG/DL (ref 8.6–10.4)
CHLORIDE SERPL-SCNC: 99 MMOL/L (ref 98–107)
CO2 SERPL-SCNC: 27 MMOL/L (ref 20–31)
CREAT SERPL-MCNC: 0.9 MG/DL (ref 0.7–1.2)
DATE LAST DOSE: NORMAL
EOSINOPHIL # BLD: 0.3 K/UL (ref 0–0.4)
EOSINOPHILS RELATIVE PERCENT: 4 % (ref 0–4)
ERYTHROCYTE [DISTWIDTH] IN BLOOD BY AUTOMATED COUNT: 13.2 % (ref 11.5–14.9)
GFR SERPL CREATININE-BSD FRML MDRD: >60 ML/MIN/1.73M2
GLUCOSE BLD-MCNC: 115 MG/DL (ref 75–110)
GLUCOSE BLD-MCNC: 129 MG/DL (ref 75–110)
GLUCOSE BLD-MCNC: 133 MG/DL (ref 75–110)
GLUCOSE BLD-MCNC: 279 MG/DL (ref 75–110)
GLUCOSE SERPL-MCNC: 138 MG/DL (ref 70–99)
HCT VFR BLD AUTO: 35.6 % (ref 41–53)
HGB BLD-MCNC: 11.9 G/DL (ref 13.5–17.5)
LYMPHOCYTES NFR BLD: 1.9 K/UL (ref 1–4.8)
LYMPHOCYTES RELATIVE PERCENT: 24 % (ref 24–44)
MCH RBC QN AUTO: 30.3 PG (ref 26–34)
MCHC RBC AUTO-ENTMCNC: 33.5 G/DL (ref 31–37)
MCV RBC AUTO: 90.6 FL (ref 80–100)
MONOCYTES NFR BLD: 0.8 K/UL (ref 0.1–1.3)
MONOCYTES NFR BLD: 10 % (ref 1–7)
NEUTROPHILS NFR BLD: 61 % (ref 36–66)
NEUTS SEG NFR BLD: 4.9 K/UL (ref 1.3–9.1)
PLATELET # BLD AUTO: 224 K/UL (ref 150–450)
PMV BLD AUTO: 6.8 FL (ref 6–12)
POTASSIUM SERPL-SCNC: 4 MMOL/L (ref 3.7–5.3)
RBC # BLD AUTO: 3.93 M/UL (ref 4.5–5.9)
SODIUM SERPL-SCNC: 137 MMOL/L (ref 135–144)
TME LAST DOSE: 1752 H
VANCOMYCIN DOSE: NORMAL MG
VANCOMYCIN SERPL-MCNC: 22.2 UG/ML
WBC OTHER # BLD: 8 K/UL (ref 3.5–11)

## 2023-11-15 PROCEDURE — 0S9C4ZX DRAINAGE OF RIGHT KNEE JOINT, PERCUTANEOUS ENDOSCOPIC APPROACH, DIAGNOSTIC: ICD-10-PCS | Performed by: ORTHOPAEDIC SURGERY

## 2023-11-15 PROCEDURE — 7100000001 HC PACU RECOVERY - ADDTL 15 MIN: Performed by: ORTHOPAEDIC SURGERY

## 2023-11-15 PROCEDURE — 6360000002 HC RX W HCPCS: Performed by: INTERNAL MEDICINE

## 2023-11-15 PROCEDURE — 36415 COLL VENOUS BLD VENIPUNCTURE: CPT

## 2023-11-15 PROCEDURE — 0SBC4ZZ EXCISION OF RIGHT KNEE JOINT, PERCUTANEOUS ENDOSCOPIC APPROACH: ICD-10-PCS | Performed by: ORTHOPAEDIC SURGERY

## 2023-11-15 PROCEDURE — 6370000000 HC RX 637 (ALT 250 FOR IP): Performed by: ORTHOPAEDIC SURGERY

## 2023-11-15 PROCEDURE — 2580000003 HC RX 258: Performed by: ANESTHESIOLOGY

## 2023-11-15 PROCEDURE — 80048 BASIC METABOLIC PNL TOTAL CA: CPT

## 2023-11-15 PROCEDURE — 85025 COMPLETE CBC W/AUTO DIFF WBC: CPT

## 2023-11-15 PROCEDURE — 99233 SBSQ HOSP IP/OBS HIGH 50: CPT | Performed by: INTERNAL MEDICINE

## 2023-11-15 PROCEDURE — 2709999900 HC NON-CHARGEABLE SUPPLY: Performed by: ORTHOPAEDIC SURGERY

## 2023-11-15 PROCEDURE — 7100000000 HC PACU RECOVERY - FIRST 15 MIN: Performed by: ORTHOPAEDIC SURGERY

## 2023-11-15 PROCEDURE — 6360000002 HC RX W HCPCS: Performed by: ORTHOPAEDIC SURGERY

## 2023-11-15 PROCEDURE — 2580000003 HC RX 258: Performed by: INTERNAL MEDICINE

## 2023-11-15 PROCEDURE — 82947 ASSAY GLUCOSE BLOOD QUANT: CPT

## 2023-11-15 PROCEDURE — 2500000003 HC RX 250 WO HCPCS: Performed by: NURSE ANESTHETIST, CERTIFIED REGISTERED

## 2023-11-15 PROCEDURE — 6370000000 HC RX 637 (ALT 250 FOR IP): Performed by: INTERNAL MEDICINE

## 2023-11-15 PROCEDURE — 3700000001 HC ADD 15 MINUTES (ANESTHESIA): Performed by: ORTHOPAEDIC SURGERY

## 2023-11-15 PROCEDURE — 3600000003 HC SURGERY LEVEL 3 BASE: Performed by: ORTHOPAEDIC SURGERY

## 2023-11-15 PROCEDURE — 2580000003 HC RX 258: Performed by: ORTHOPAEDIC SURGERY

## 2023-11-15 PROCEDURE — 99222 1ST HOSP IP/OBS MODERATE 55: CPT | Performed by: ORTHOPAEDIC SURGERY

## 2023-11-15 PROCEDURE — 1200000000 HC SEMI PRIVATE

## 2023-11-15 PROCEDURE — 3600000013 HC SURGERY LEVEL 3 ADDTL 15MIN: Performed by: ORTHOPAEDIC SURGERY

## 2023-11-15 PROCEDURE — 80202 ASSAY OF VANCOMYCIN: CPT

## 2023-11-15 PROCEDURE — 6360000002 HC RX W HCPCS: Performed by: NURSE ANESTHETIST, CERTIFIED REGISTERED

## 2023-11-15 PROCEDURE — 3700000000 HC ANESTHESIA ATTENDED CARE: Performed by: ORTHOPAEDIC SURGERY

## 2023-11-15 RX ORDER — METOCLOPRAMIDE HYDROCHLORIDE 5 MG/ML
10 INJECTION INTRAMUSCULAR; INTRAVENOUS
Status: DISCONTINUED | OUTPATIENT
Start: 2023-11-15 | End: 2023-11-15 | Stop reason: HOSPADM

## 2023-11-15 RX ORDER — FENTANYL CITRATE 0.05 MG/ML
25 INJECTION, SOLUTION INTRAMUSCULAR; INTRAVENOUS EVERY 5 MIN PRN
Status: DISCONTINUED | OUTPATIENT
Start: 2023-11-15 | End: 2023-11-15 | Stop reason: HOSPADM

## 2023-11-15 RX ORDER — DEXAMETHASONE SODIUM PHOSPHATE 4 MG/ML
INJECTION, SOLUTION INTRA-ARTICULAR; INTRALESIONAL; INTRAMUSCULAR; INTRAVENOUS; SOFT TISSUE PRN
Status: DISCONTINUED | OUTPATIENT
Start: 2023-11-15 | End: 2023-11-15 | Stop reason: SDUPTHER

## 2023-11-15 RX ORDER — DIPHENHYDRAMINE HYDROCHLORIDE 50 MG/ML
12.5 INJECTION INTRAMUSCULAR; INTRAVENOUS
Status: DISCONTINUED | OUTPATIENT
Start: 2023-11-15 | End: 2023-11-15 | Stop reason: HOSPADM

## 2023-11-15 RX ORDER — SODIUM CHLORIDE 9 MG/ML
INJECTION, SOLUTION INTRAVENOUS CONTINUOUS
Status: DISCONTINUED | OUTPATIENT
Start: 2023-11-15 | End: 2023-11-15 | Stop reason: HOSPADM

## 2023-11-15 RX ORDER — PROPOFOL 10 MG/ML
INJECTION, EMULSION INTRAVENOUS PRN
Status: DISCONTINUED | OUTPATIENT
Start: 2023-11-15 | End: 2023-11-15 | Stop reason: SDUPTHER

## 2023-11-15 RX ORDER — ONDANSETRON 2 MG/ML
4 INJECTION INTRAMUSCULAR; INTRAVENOUS
Status: DISCONTINUED | OUTPATIENT
Start: 2023-11-15 | End: 2023-11-15 | Stop reason: HOSPADM

## 2023-11-15 RX ORDER — SODIUM CHLORIDE 0.9 % (FLUSH) 0.9 %
5-40 SYRINGE (ML) INJECTION EVERY 12 HOURS SCHEDULED
Status: DISCONTINUED | OUTPATIENT
Start: 2023-11-15 | End: 2023-11-15 | Stop reason: HOSPADM

## 2023-11-15 RX ORDER — LIDOCAINE HYDROCHLORIDE 20 MG/ML
INJECTION, SOLUTION EPIDURAL; INFILTRATION; INTRACAUDAL; PERINEURAL PRN
Status: DISCONTINUED | OUTPATIENT
Start: 2023-11-15 | End: 2023-11-15 | Stop reason: SDUPTHER

## 2023-11-15 RX ORDER — ROPIVACAINE HYDROCHLORIDE 5 MG/ML
INJECTION, SOLUTION EPIDURAL; INFILTRATION; PERINEURAL PRN
Status: DISCONTINUED | OUTPATIENT
Start: 2023-11-15 | End: 2023-11-15 | Stop reason: ALTCHOICE

## 2023-11-15 RX ORDER — KETOROLAC TROMETHAMINE 30 MG/ML
INJECTION, SOLUTION INTRAMUSCULAR; INTRAVENOUS PRN
Status: DISCONTINUED | OUTPATIENT
Start: 2023-11-15 | End: 2023-11-15 | Stop reason: SDUPTHER

## 2023-11-15 RX ORDER — ONDANSETRON 2 MG/ML
INJECTION INTRAMUSCULAR; INTRAVENOUS PRN
Status: DISCONTINUED | OUTPATIENT
Start: 2023-11-15 | End: 2023-11-15 | Stop reason: SDUPTHER

## 2023-11-15 RX ORDER — SODIUM CHLORIDE 0.9 % (FLUSH) 0.9 %
5-40 SYRINGE (ML) INJECTION PRN
Status: DISCONTINUED | OUTPATIENT
Start: 2023-11-15 | End: 2023-11-15 | Stop reason: HOSPADM

## 2023-11-15 RX ORDER — FENTANYL CITRATE 50 UG/ML
INJECTION, SOLUTION INTRAMUSCULAR; INTRAVENOUS PRN
Status: DISCONTINUED | OUTPATIENT
Start: 2023-11-15 | End: 2023-11-15 | Stop reason: SDUPTHER

## 2023-11-15 RX ORDER — SODIUM CHLORIDE 9 MG/ML
INJECTION, SOLUTION INTRAVENOUS PRN
Status: DISCONTINUED | OUTPATIENT
Start: 2023-11-15 | End: 2023-11-15 | Stop reason: HOSPADM

## 2023-11-15 RX ADMIN — GABAPENTIN 100 MG: 100 CAPSULE ORAL at 20:32

## 2023-11-15 RX ADMIN — SODIUM CHLORIDE: 9 INJECTION, SOLUTION INTRAVENOUS at 14:40

## 2023-11-15 RX ADMIN — CEFEPIME 2000 MG: 2 INJECTION, POWDER, FOR SOLUTION INTRAVENOUS at 09:29

## 2023-11-15 RX ADMIN — LIDOCAINE HYDROCHLORIDE 80 MG: 20 INJECTION, SOLUTION EPIDURAL; INFILTRATION; INTRACAUDAL; PERINEURAL at 14:29

## 2023-11-15 RX ADMIN — CEFEPIME 2000 MG: 2 INJECTION, POWDER, FOR SOLUTION INTRAVENOUS at 01:20

## 2023-11-15 RX ADMIN — PROPOFOL 50 MG: 10 INJECTION, EMULSION INTRAVENOUS at 14:30

## 2023-11-15 RX ADMIN — FENTANYL CITRATE 25 MCG: 50 INJECTION, SOLUTION INTRAMUSCULAR; INTRAVENOUS at 14:44

## 2023-11-15 RX ADMIN — FENTANYL CITRATE 25 MCG: 50 INJECTION, SOLUTION INTRAMUSCULAR; INTRAVENOUS at 14:53

## 2023-11-15 RX ADMIN — VANCOMYCIN HYDROCHLORIDE 1000 MG: 1 INJECTION, POWDER, LYOPHILIZED, FOR SOLUTION INTRAVENOUS at 05:36

## 2023-11-15 RX ADMIN — PROPOFOL 200 MG: 10 INJECTION, EMULSION INTRAVENOUS at 14:29

## 2023-11-15 RX ADMIN — SODIUM CHLORIDE: 9 INJECTION, SOLUTION INTRAVENOUS at 12:08

## 2023-11-15 RX ADMIN — FENTANYL CITRATE 50 MCG: 50 INJECTION, SOLUTION INTRAMUSCULAR; INTRAVENOUS at 14:29

## 2023-11-15 RX ADMIN — KETOROLAC TROMETHAMINE 30 MG: 30 INJECTION, SOLUTION INTRAMUSCULAR; INTRAVENOUS at 14:49

## 2023-11-15 RX ADMIN — PROPRANOLOL HYDROCHLORIDE 120 MG: 60 CAPSULE, EXTENDED RELEASE ORAL at 09:28

## 2023-11-15 RX ADMIN — DEXAMETHASONE SODIUM PHOSPHATE 8 MG: 4 INJECTION INTRA-ARTICULAR; INTRALESIONAL; INTRAMUSCULAR; INTRAVENOUS; SOFT TISSUE at 14:35

## 2023-11-15 RX ADMIN — VANCOMYCIN HYDROCHLORIDE 1000 MG: 1 INJECTION, POWDER, LYOPHILIZED, FOR SOLUTION INTRAVENOUS at 17:52

## 2023-11-15 RX ADMIN — CEFEPIME 2000 MG: 2 INJECTION, POWDER, FOR SOLUTION INTRAVENOUS at 18:57

## 2023-11-15 RX ADMIN — ATORVASTATIN CALCIUM 40 MG: 40 TABLET, FILM COATED ORAL at 20:32

## 2023-11-15 RX ADMIN — ONDANSETRON 4 MG: 2 INJECTION INTRAMUSCULAR; INTRAVENOUS at 14:41

## 2023-11-15 ASSESSMENT — PAIN SCALES - GENERAL
PAINLEVEL_OUTOF10: 0
PAINLEVEL_OUTOF10: 2

## 2023-11-15 ASSESSMENT — PAIN DESCRIPTION - ORIENTATION: ORIENTATION: RIGHT

## 2023-11-15 ASSESSMENT — PAIN DESCRIPTION - LOCATION: LOCATION: KNEE

## 2023-11-15 NOTE — PLAN OF CARE
Problem: Discharge Planning  Goal: Discharge to home or other facility with appropriate resources  Outcome: Progressing  Flowsheets (Taken 11/14/2023 2000)  Discharge to home or other facility with appropriate resources: Identify barriers to discharge with patient and caregiver     Problem: Skin/Tissue Integrity  Goal: Absence of new skin breakdown  Description: 1. Monitor for areas of redness and/or skin breakdown  2. Assess vascular access sites hourly  3. Every 4-6 hours minimum:  Change oxygen saturation probe site  4. Every 4-6 hours:  If on nasal continuous positive airway pressure, respiratory therapy assess nares and determine need for appliance change or resting period.   Outcome: Progressing     Problem: ABCDS Injury Assessment  Goal: Absence of physical injury  Outcome: Progressing  Flowsheets (Taken 11/15/2023 0003)  Absence of Physical Injury: Implement safety measures based on patient assessment     Problem: Safety - Adult  Goal: Free from fall injury  Outcome: Progressing  Flowsheets (Taken 11/15/2023 0003)  Free From Fall Injury: Instruct family/caregiver on patient safety

## 2023-11-15 NOTE — OP NOTE
Operative Note      Patient: Elisabet Ac  YOB: 1955  MRN: [id number]    Date of Procedure: 11/15/2023    Pre-Op Diagnosis Codes:     * Staphylococcal arthritis of right knee ([address]) [M00.061]    Post-Op Diagnosis:  Same plus medial meniscus tear right knee       Procedure(s):  KNEE ARTHROSCOPY WITH IRRIGATION AND DEBRIDEMENT, PARTIAL MEDIAL MENISECTOMY    Surgeon(s): Makayla Avendano MD    Assistant:   * No surgical staff found *    Anesthesia: General    Estimated Blood Loss (mL): Minimal    Complications: None    Specimens:   * No specimens in log *    Implants:  * No implants in log *      Drains: * No LDAs found *    Findings: Slight purulence right knee but also complex tear of the posterior horn near the root ligament of medial meniscus        Detailed Description of Procedure: The patient is a 61-year-old gentleman who presented to [facility] emergency room after a 1 day history of right knee pain and swelling. He denied injury or trauma. He did have a treatment for strep throat last week. He denies history of gout or pseudogout or any other other infectious process. Denies fever or chills. He did he is a diabetic. He was seen in the emergency room where x-rays were unremarkable except for effusion. He did have an aspiration of the knee with this showed that he had 6600 white cells with 99% polyethylene's. He had also elevated sed rate and CRP. As such this patient presumed to have a septic knee and was he consented good comprehension all risk and benefits for irrigation debridement of right knee. Patient had been started on antibiotics per primary care. Patient was taken operating room has a subtle duction general anesthesia tourniquet applied the right thigh was placed leg keita leg was exsanguinated tourniquet plated to 452 mmHg the leg was then prepped and draped usual fashion timeout was called to verify laterality.   Medial lateral portal established the right

## 2023-11-15 NOTE — CONSULTS
[facility]    Reason for consult  Septic right knee. HPI / Chief Zandra Pena is a 79 y.o. old male who presents for who presented to the emergency room last evening for a septic right knee with knee pain. Patient states that he had no injury or trauma to his knee. Patient did have evidence for strep throat last week was on oral antibiotics. Patient is diabetic but he did notice an onset of acute pain and swelling to his right knee without injury. He was seen at [facility] emergency room where x-rays were unremarkable other than an effusion. He did have an aspiration and aspiration had elevated white count over 6600 with 99% polys there was no evidence for crystals. Also sellar sed rate and CRP which were elevated. Past Medical History  Richard Saleh  has a past medical history of Diabetes mellitus ([address]), Hyperlipidemia, Hypertension, Osteoarthritis, Psoriasis, and Snores/ Sleep Apnea. Past Surgical History  Richard Saleh  has a past surgical history that includes Foot surgery (Right); Cataract removal with implant (Right, 02/05/2016); and vitrectomy (Right, 02/18/2016). Current Medications  Reviewed. See EMR for details. Allergies  Allergies have been reviewed. Richard Saleh has No Known Allergies. Social History  Richard Saleh  reports that he has never smoked. He has never used smokeless tobacco. He reports that he does not drink alcohol and does not use drugs. Family History  Abdoulaye's family history includes Alzheimer's Disease in his father; Diabetes type 2  in his mother; Drug Abuse in his half-brother; Heart Disease in his mother; Hypertension in his father; Kidney Disease in his mother; No Known Problems in his brother, brother, brother, sister, son, and son; Obesity in his half-sister; Stroke in his father. Review of Systems   History obtained from the patient.    Constitution: no fever or chills  Musculoskeletal: As noted in the HPI   Neurologic: pain    Physical

## 2023-11-15 NOTE — CARE COORDINATION
[] [facility]) - [facility] & Therapy  [address]  [place]: [phone]   F: [phone]     Physical Therapy Cancel/No Show note/HOLD     Date: 11/15/2023  Patient: Ellis Naidu  : 1955  MRN: [id number]    Visit Count: 3/10  Cancels/No Shows to date:       Pt's wife calls 11/15 due to patient being hospitalized due to R knee septic arthritis. This PT returns call to wife and discusses plan. Will hold from the pool due to concerns for infection. Once pt is discharged and feels ready to return to outpatient PT will schedule x1 visit to begin with this PT for re-assessment. Will likely adjust plan based on restrictions but will continue to address the low back. Pt is to call to schedule.      For today's appointment patient:    []  Cancelled    [] Rescheduled appointment    [] No-show     Reason given by patient:    []  Patient ill    []  Conflicting appointment    [] No transportation      [] Conflict with work    [] No reason given    [] Weather related    [] [id number]    [] Other:      Comments:        [] Next appointment was confirmed    Electronically signed by: Loretha Libman, PT

## 2023-11-15 NOTE — CARE COORDINATION
DISCHARGE PLANNING NOTE:    Unable to speak with patient regarding discharge plan as he is off the floor for I&D right knee. Will try again later today.     Electronically signed by Nasreen Rene RN on 11/15/2023 at 1:10 PM

## 2023-11-15 NOTE — PROGRESS NOTES
[address]    HISTORY AND PHYSICAL EXAMINATION            Date:   11/15/2023  Patient name:  Selvin Cisneros  Date of admission:  11/14/2023 10:19 AM  MRN:   [id number]  Account:  [de-identified]  YOB: 1955  PCP:    Bailey Santillan MD  Room:   7004/9164-39  Code Status:    Prior    Chief Complaint:     Chief Complaint   Patient presents with    Joint Swelling     Pt c/o right knee pain 2-3 days ago. Pt is currently on ATB for strept. History Obtained From:     patient    History of Present Illness: The patient is a 79 y.o. Non- / non  male who presents with Joint Swelling (Pt c/o right knee pain 2-3 days ago. Pt is currently on ATB for strept.)   and he is admitted to the hospital for the management of      Patient is 62-year-old male with past medical history of diabetes, hypertension presented to the ER with knee pain and swelling going on last couple of days. ,  Patient states that he was having difficulty walking around and putting weight on the knee  The pain got really worse this morning wife also noticed that knee was swollen and warm so brought him to the ER.   In the ER patient was found to have large joint effusion, had diagnostic arthrocentesis,  Concern of septic arthritis, started on antibiotics,  Ortho consulted  Patient blood pressure was borderline high in the ER secondary to pain      Past Medical History:     Past Medical History:   Diagnosis Date    Diabetes mellitus ([address])     Hyperlipidemia     Hypertension     Osteoarthritis     Psoriasis     Snores/ Sleep Apnea         Past Surgical History:     Past Surgical History:   Procedure Laterality Date    CATARACT REMOVAL WITH IMPLANT Right 02/05/2016    Raffoul/ Traumatic corneal lac repair with phaco and IOL    FOOT SURGERY Right     removal of bunion    KNEE ARTHROSCOPY Right 11/15/2023    KNEE ARTHROSCOPY WITH IRRIGATION AND DEBRIDEMENT, PARTIAL MEDIAL

## 2023-11-15 NOTE — CARE COORDINATION
Case Management Assessment  Initial Evaluation    Date/Time of Evaluation: 11/15/2023 4:01 PM  Assessment Completed by: Jinny Hidalgo RN    If patient is discharged prior to next notation, then this note serves as note for discharge by case management. Patient Name: Maryana Perez                   YOB: 1955  Diagnosis: Septic arthritis [facility]) [M00.9]  Pyogenic arthritis of right knee joint, due to unspecified organism [facility]) [M00.9]                   Date / Time: 11/14/2023 10:19 AM    Patient Admission Status: Inpatient   Readmission Risk (Low < 19, Mod (19-27), High > 27): Readmission Risk Score: 6.8    Current PCP: Natividad Le MD  PCP verified by CM? Yes    Chart Reviewed: Yes      History Provided by: Spouse Paolamarco a Gaston)  Patient Orientation: Unable to Assess, Other (see comment) (Pt off the floor)    Patient Cognition: Other (see comment) (NATALYA, pt off the floor)    Hospitalization in the last 30 days (Readmission):  No    If yes, Readmission Assessment in  Navigator will be completed. Advance Directives:      Code Status: Prior   Patient's Primary Decision Maker is:        Discharge Planning:    Patient lives with: Spouse/Significant Other Type of Home: House  Primary Care Giver: Self  Patient Support Systems include: Spouse/Significant Other   Current Financial resources: None  Current community resources: None  Current services prior to admission: Durable Medical Equipment            Current DME: [place], Wheelchair            Type of Home Care services:  IV Therapy    ADLS  Prior functional level: Independent in ADLs/IADLs  Current functional level: Independent in ADLs/IADLs    PT AM-PAC:   /24  OT AM-PAC:   /24    Family can provide assistance at DC: Yes  Would you like Case Management to discuss the discharge plan with any other family members/significant others, and if so, who?  Yes (spouse, Julien Service)  Plans to Return to Present Housing: Yes  Other Identified [address], RN  Case Management Department  Ph: [phone] Fax: [phone]

## 2023-11-15 NOTE — ANESTHESIA PRE PROCEDURE
Neuro/Psych:               GI/Hepatic/Renal:             Endo/Other:    (+) DiabetesType II DM, , .                  ROS comment:  Staphylococcal arthritis of right knee  Abdominal:             Vascular: negative vascular ROS. Other Findings:           Anesthesia Plan      general     ASA 3       Induction: intravenous. MIPS: Postoperative opioids intended and Prophylactic antiemetics administered. Anesthetic plan and risks discussed with patient. Plan discussed with CRNA.                     Tristin Ruiz MD   11/15/2023

## 2023-11-16 ENCOUNTER — APPOINTMENT (OUTPATIENT)
Dept: PHYSICAL THERAPY | Age: 68
End: 2023-11-16
Attending: NEUROLOGICAL SURGERY
Payer: COMMERCIAL

## 2023-11-16 LAB
GLUCOSE BLD-MCNC: 146 MG/DL (ref 75–110)
GLUCOSE BLD-MCNC: 148 MG/DL (ref 75–110)
GLUCOSE BLD-MCNC: 187 MG/DL (ref 75–110)
GLUCOSE BLD-MCNC: 187 MG/DL (ref 75–110)

## 2023-11-16 PROCEDURE — 97162 PT EVAL MOD COMPLEX 30 MIN: CPT

## 2023-11-16 PROCEDURE — 1200000000 HC SEMI PRIVATE

## 2023-11-16 PROCEDURE — 6360000002 HC RX W HCPCS: Performed by: ORTHOPAEDIC SURGERY

## 2023-11-16 PROCEDURE — 6370000000 HC RX 637 (ALT 250 FOR IP): Performed by: ORTHOPAEDIC SURGERY

## 2023-11-16 PROCEDURE — 6360000002 HC RX W HCPCS: Performed by: INTERNAL MEDICINE

## 2023-11-16 PROCEDURE — 6360000002 HC RX W HCPCS: Performed by: NURSE PRACTITIONER

## 2023-11-16 PROCEDURE — 97530 THERAPEUTIC ACTIVITIES: CPT

## 2023-11-16 PROCEDURE — 97116 GAIT TRAINING THERAPY: CPT

## 2023-11-16 PROCEDURE — 97166 OT EVAL MOD COMPLEX 45 MIN: CPT

## 2023-11-16 PROCEDURE — 2580000003 HC RX 258: Performed by: INTERNAL MEDICINE

## 2023-11-16 PROCEDURE — 82947 ASSAY GLUCOSE BLOOD QUANT: CPT

## 2023-11-16 PROCEDURE — 99232 SBSQ HOSP IP/OBS MODERATE 35: CPT | Performed by: INTERNAL MEDICINE

## 2023-11-16 PROCEDURE — 99223 1ST HOSP IP/OBS HIGH 75: CPT | Performed by: INTERNAL MEDICINE

## 2023-11-16 PROCEDURE — 2580000003 HC RX 258: Performed by: ORTHOPAEDIC SURGERY

## 2023-11-16 RX ORDER — ONDANSETRON 2 MG/ML
4 INJECTION INTRAMUSCULAR; INTRAVENOUS EVERY 6 HOURS PRN
Status: DISCONTINUED | OUTPATIENT
Start: 2023-11-16 | End: 2023-11-17 | Stop reason: HOSPADM

## 2023-11-16 RX ORDER — ONDANSETRON 2 MG/ML
INJECTION INTRAMUSCULAR; INTRAVENOUS
Status: DISPENSED
Start: 2023-11-16 | End: 2023-11-16

## 2023-11-16 RX ADMIN — ATORVASTATIN CALCIUM 40 MG: 40 TABLET, FILM COATED ORAL at 21:11

## 2023-11-16 RX ADMIN — HYDROCHLOROTHIAZIDE 25 MG: 25 TABLET ORAL at 08:16

## 2023-11-16 RX ADMIN — LOSARTAN POTASSIUM 100 MG: 50 TABLET, FILM COATED ORAL at 08:15

## 2023-11-16 RX ADMIN — GABAPENTIN 100 MG: 100 CAPSULE ORAL at 08:17

## 2023-11-16 RX ADMIN — CEFEPIME 2000 MG: 2 INJECTION, POWDER, FOR SOLUTION INTRAVENOUS at 02:10

## 2023-11-16 RX ADMIN — GABAPENTIN 100 MG: 100 CAPSULE ORAL at 21:11

## 2023-11-16 RX ADMIN — PROPRANOLOL HYDROCHLORIDE 120 MG: 60 CAPSULE, EXTENDED RELEASE ORAL at 08:17

## 2023-11-16 RX ADMIN — CEFEPIME 2000 MG: 2 INJECTION, POWDER, FOR SOLUTION INTRAVENOUS at 10:51

## 2023-11-16 RX ADMIN — VANCOMYCIN HYDROCHLORIDE 750 MG: 750 INJECTION, POWDER, LYOPHILIZED, FOR SOLUTION INTRAVENOUS at 06:12

## 2023-11-16 RX ADMIN — CEFTRIAXONE SODIUM 2000 MG: 2 INJECTION, POWDER, FOR SOLUTION INTRAMUSCULAR; INTRAVENOUS at 15:01

## 2023-11-16 RX ADMIN — ONDANSETRON 4 MG: 2 INJECTION INTRAMUSCULAR; INTRAVENOUS at 02:58

## 2023-11-16 ASSESSMENT — PAIN SCALES - GENERAL: PAINLEVEL_OUTOF10: 0

## 2023-11-16 NOTE — CARE COORDINATION
ONGOING DISCHARGE PLAN:    Patient is alert and oriented x4. Spoke with patient regarding discharge plan and patient confirms that plan is still home. Explained to patient that he may need IV atb's on discharge and that referral has been sent to [facility]. Wife is a nurse and can assist with administering IV atb's. POD#1 right knee I&D. Active order for IV Cefepime and IV Vanco. ID consulted. Will continue to follow for additional discharge needs. If patient is discharged prior to next notation, then this note serves as note for discharge by case management. Electronically signed by Sandra Polo RN on 11/16/2023 at 1:28 PM    Pt to be discharged on IV Rocephin 2gm daily through 12/30/23 per Dr. Gibran Hamilton. Script faxed to [address]. Notified Liana from [address] of this. Liana states [employer] can provide nursing and pt is covered at 100%. Plan for discharge home tomorrow.     Electronically signed by Sandra Polo RN on 11/16/2023 at 2:18 PM

## 2023-11-16 NOTE — PROGRESS NOTES
Physical Therapy  Facility/Department: [facility] MED SURG  Physical Therapy Initial Assessment    Name: Priya Moyer  : 1955  MRN: [id number]  Date of Service: 2023    Discharge Recommendations:  Outpatient PT (OP PT for his back)   PT Equipment Recommendations  Equipment Needed: Yes (given gait belt for D/C)  Mobility Devices: Terrace Usama: Rolling      Patient Diagnosis(es): The encounter diagnosis was Pyogenic arthritis of right knee joint, due to unspecified organism ([address]). Past Medical History:  has a past medical history of Diabetes mellitus ([address]), Hyperlipidemia, Hypertension, Osteoarthritis, Psoriasis, and Snores/ Sleep Apnea. Past Surgical History:  has a past surgical history that includes Foot surgery (Right); Cataract removal with implant (Right, 2016); vitrectomy (Right, 2016); and Knee arthroscopy (Right, 11/15/2023). Assessment   Assessment: pt is safe to return home w/ spouse. Pt shows good technique on level surfaces using a wheeled walker and on steps using 2 rails. Pt has only one step at home. Pt's spouse is a nurse and able to assist care at home. pt is safe to return home.   Treatment Diagnosis: impaired mobility due to septic right knee  Therapy Prognosis: Good  Decision Making: Medium Complexity  History: pt admitted w/ septic right knee  Exam: ROM, MMT, balance and mobility assessments  Clinical Presentation: MOD I for bed mobility, SBA for sit <> stand and CGA x 1 for gait w/ wheeled walker right LE WBAT  and 4\" x 3 and 6\" x 2 using 2 rails, spouse observed gait and stair climbing and acknowledges understanding a of guarding techniques  Barriers to Learning: none  No Skilled PT: Safe to return home  Requires PT Follow-Up: Yes  Activity Tolerance  Activity Tolerance: Patient tolerated treatment well     Plan   Physical Therapy Plan  General Plan: Discharge with evaluation only  Safety Devices  Type of Devices: Call light within reach, Gait belt, Left in bed,

## 2023-11-16 NOTE — PLAN OF CARE
Problem: Discharge Planning  Goal: Discharge to home or other facility with appropriate resources  Outcome: Progressing  Flowsheets (Taken 11/16/2023 0730)  Discharge to home or other facility with appropriate resources:   Identify barriers to discharge with patient and caregiver   Arrange for needed discharge resources and transportation as appropriate   Identify discharge learning needs (meds, wound care, etc)     Problem: Skin/Tissue Integrity  Goal: Absence of new skin breakdown  Description: 1. Monitor for areas of redness and/or skin breakdown  2. Assess vascular access sites hourly  3. Every 4-6 hours minimum:  Change oxygen saturation probe site  4. Every 4-6 hours:  If on nasal continuous positive airway pressure, respiratory therapy assess nares and determine need for appliance change or resting period.   11/16/2023 1749 by Edilson Garza RN  Outcome: Progressing     Problem: ABCDS Injury Assessment  Goal: Absence of physical injury  11/16/2023 1749 by Edilson Garza RN  Outcome: Progressing  Flowsheets (Taken 11/16/2023 0730)  Absence of Physical Injury: Implement safety measures based on patient assessment     Problem: Safety - Adult  Goal: Free from fall injury  Outcome: Progressing  Flowsheets (Taken 11/16/2023 0730)  Free From Fall Injury: Instruct family/caregiver on patient safety     Problem: Pain  Goal: Verbalizes/displays adequate comfort level or baseline comfort level  Outcome: Progressing

## 2023-11-16 NOTE — PROGRESS NOTES
Patient not seen today however did discuss with patient's nurse that the patient would be allowed up weightbearing as tolerated. He can be discharged at any point from my point of view. Patient noted to have no growth at 2 days of his knee aspirate.   Patient remains on antibiotics and await infectious disease recommendations    Patient to follow-up with me in 2 weeks

## 2023-11-16 NOTE — PROGRESS NOTES
[facility]   Occupational Therapy Evaluation  Date: 23  Patient Name: Louann Kemp       Room: [address] [place] [address]  MRN: [id number]  Account: [de-identified]   : 1955  (70 y.o.) Gender: male     Discharge Recommendations: The patient's needs are being met with no further Occupational Therapy recommended at discharge. OT Equipment Recommendations  Other: TBD    Referring Practitioner: Estefany Dumas MD  Diagnosis: Septic arthritis Additional Pertinent Hx: Patient is 71-year-old male with past medical history of diabetes, hypertension presented to the ER with knee pain and swelling going on last couple of days. ,  Patient states that he was having difficulty walking around and putting weight on the knee  The pain got really worse this morning wife also noticed that knee was swollen and warm so brought him to the ER. Treatment Diagnosis: impaired self care status    Past Medical History:  has a past medical history of Diabetes mellitus ([address]), Hyperlipidemia, Hypertension, Osteoarthritis, Psoriasis, and Snores/ Sleep Apnea. Past Surgical History:   has a past surgical history that includes Foot surgery (Right); Cataract removal with implant (Right, 2016); vitrectomy (Right, 2016); and Knee arthroscopy (Right, 11/15/2023).     Restrictions  Restrictions/Precautions  Restrictions/Precautions: Weight Bearing (IV in R wrist)  Required Braces or Orthoses?: No  Implants present? :  (pt denies)  Lower Extremity Weight Bearing Restrictions  Right Lower Extremity Weight Bearing: Weight Bearing As Tolerated  Position Activity Restriction  Other position/activity restrictions: s/p I&D of R knee on 11/15/23 with Dr. Seven Buenrostro, activity as tolerated    Vitals  Vitals  Pulse: 67  BP: (!) 121/54  MAP (Calculated): 76  Respirations: 16  SpO2: 94 %  O2 Device: None (Room air)     Subjective  Subjective: \"Just who I wanted to see\" pt states in response to therapy entering

## 2023-11-16 NOTE — PROGRESS NOTES
Vancomycin Dosing by Pharmacy - Daily Note   Vancomycin Therapy Day:  2  Indication: bone and joint infection    Allergies:  Patient has no known allergies. Actual Weight:    Wt Readings from Last 1 Encounters:   11/14/23 106.6 kg (235 lb 0.2 oz)       Labs/Ancillary Data  Estimated Creatinine Clearance: 99 mL/min (based on SCr of 0.9 mg/dL). Recent Labs     11/14/23  1150 11/14/23  1237 11/15/23  0559   CREATININE 0.9  --  0.9   BUN 19  --  23   WBC  --  9.3 8.0     No results found for: \"PROCAL\"    Intake/Output Summary (Last 24 hours) at 11/15/2023 2244  Last data filed at 11/15/2023 1552  Gross per 24 hour   Intake 700 ml   Output 650 ml   Net 50 ml     Temp: 97.8    Culture Date / Source  /  Results  See micro tab  Recent vancomycin administrations                     vancomycin (VANCOCIN) 1,000 mg in sodium chloride 0.9 % 250 mL IVPB ([id number]) (mg) 1,000 mg New Bag 11/15/23 1752     1,000 mg New Bag  0536    vancomycin (VANCOCIN) 2,500 mg in sodium chloride 0.9 % 500 mL IVPB (mg) 2,500 mg New Bag 11/14/23 1729                    Vancomycin Concentrations:   TROUGH:  No results for input(s): \"VANCOTROUGH\" in the last 72 hours. RANDOM:    Recent Labs     11/15/23  2145   VANCORANDOM 22.2       MRSA Nasal Swab: N/A. Non-respiratory infection. Ronal Craw PLAN     Decrease dose to 750 mg q12h IV  Ensured BUN/sCr ordered at baseline and every 48 hours x at least 3 levels, then at least weekly.   Repeat vancomycin concentration ordered for 11/17 @ 0600   Pharmacy will continue to monitor patient and adjust therapy as indicated      Vancomycin Target Concentration Parameters  Treatment  Population Target AUC/SIMON Target Trough   Invasive MRSA Infection (bacteremia, pneumonia, meningitis, endocarditis, osteomyelitis)  Sepsis (undifferentiated) 400-600 N/A   Infection due to non-MRSA pathogen  Empiric treatment of non-invasive MRSA infection  (SSTI, UTI) <500 10-15 mg/L   CrCl < 29 mL/min  Rapidly fluctuating serum creatinine   JÚNIOR N/A < 15 mg/L     Renal replacement therapy is dosed by levels, per hospital protocol. Abbreviations  * Pauc: probability that AUC is >400 (efficacy); Pconc: probability that Ctrough is above 20 ?g/mL (toxicity); Tox: Probability of nephrotoxicity, based on Katie et al. Clin Infect Dis 2009. Thank you for the consult. Pharmacy will continue to follow. [facility] [address] in-patient pharmacy

## 2023-11-16 NOTE — DISCHARGE INSTR - COC
Continuity of Care Form    Patient Name: Kush Hinton   :  1955  MRN:  [id number]    Admit date:  2023  Discharge date:  ***    Code Status Order: Full Code   Advance Directives:   Advance Care Flowsheet Documentation       Date/Time Healthcare Directive Type of Healthcare Directive Copy in [address] Agent's Name Healthcare Agent's Phone Number    11/15/23 1157 No, patient does not have an advance directive for healthcare treatment -- -- -- -- --            Admitting Physician:  Trevor Mariee MD  PCP: Twanna Mcburney, MD    Discharging Nurse: [facility] Unit/Room#: 2549/7564-63  Discharging Unit Phone Number: ***    Emergency Contact:   Extended Emergency Contact Information  Primary Emergency Contact: Kristin Rodriguez  Address: [facility] [employer], [address] [place] [address] of [address] Phone: [phone]  Mobile Phone: [phone]  Relation: Spouse    Past Surgical History:  Past Surgical History:   Procedure Laterality Date    CATARACT REMOVAL WITH IMPLANT Right 2016    Raffoul/ Traumatic corneal lac repair with phaco and IOL    FOOT SURGERY Right     removal of bunion    KNEE ARTHROSCOPY Right 11/15/2023    KNEE ARTHROSCOPY WITH IRRIGATION AND DEBRIDEMENT, PARTIAL MEDIAL MENISECTOMY performed by Fabricio Stock MD at [address] [place] [address] Right 2016       Immunization History:   Immunization History   Administered Date(s) Administered    COVID-19, MODERNA BLUE border, Primary or Immunocompromised, (age 12y+), IM, 100 mcg/0.5mL 2021, 04/10/2021    COVID-19, MODERNA Nikunj, (age 12y+), IM, 48 mcg/0.5 mL 2022    COVID-19, Pat Steinberg, (- formula), (age 12y+), IM, 50mcg/0.5mL 2023    TDaP, ADACEL (age 6y-58y), [facility] (age 10y+), IM, 0.5mL 2016       Active Problems:  Patient Active Problem List   Diagnosis Code    Cataract of right eye following rupture of capsule H26.101    Essential

## 2023-11-16 NOTE — PROGRESS NOTES
Vancomycin Level, Random    Collection Time: 11/15/23  9:45 PM   Result Value Ref Range    Vancomycin Rm 22.2 ug/mL    Vancomycin Random Dose amount 1000 MG     Vancomycin Random Date last dose [id number]     Vancomycin Random Time last dose 1752    POC Glucose Fingerstick    Collection Time: 11/16/23  6:45 AM   Result Value Ref Range    POC Glucose 187 (H) 75 - 110 mg/dL   POC Glucose Fingerstick    Collection Time: 11/16/23 11:23 AM   Result Value Ref Range    POC Glucose 187 (H) 75 - 110 mg/dL       Imaging/Diagnostics:        Assessment :      Primary Problem  Septic arthritis [facility])    Active Hospital Problems    Diagnosis Date Noted    Acute medial meniscus tear of right knee [S83.241A] 11/15/2023    Septic arthritis ([address]) [M00.9] 11/14/2023       Plan:     Patient status Admit as inpatient in the  Progressive Unit/Step down    Patient is 59-year-old male with past medical history of diabetes and hypertension presented to the ER with knee pain and swelling, concern of septic arthritis  S/p diagnostic arthrocentesis fluid cultures currently pending  Fluid studies pending  Ortho consulted  We will start Vanco and cefepime, consult ID  CRP sed rate elevated no leukocytosis  Hypertension blood pressure suboptimally controlled likely secondary to pain, on losartan and hydrochlorothiazide which is been resumed  Non-insulin-dependent diabetes mellitus, placed on insulin sliding scale. DVT prophylaxis    11/15  Septic arthritis right knee  Patient underwent right knee arthroscopy partial medial meniscectomy earlier today  Patient seen soon after return from or  Blood pressure and blood sugars controlled  No fever no leukocytosis  Following, no growth on cultures so far continuing on IV vancomycin and cefepime    11/16  Blood sugars controlled blood pressure controlled  No fever  No growth on cultures so far  Patient overall doing well has been ambulating with physical therapy.   Awaiting final ID recommendations regarding antibiotics. Consultations:   IP CONSULT TO ORTHOPEDIC SURGERY  IP CONSULT TO HOSPITALIST  PHARMACY TO DOSE VANCOMYCIN  IP CONSULT TO INFECTIOUS DISEASES  IP CONSULT TO ORTHOPEDIC SURGERY     Patient is admitted as inpatient status because of co-morbidities listed above, severity of signs and symptoms as outlined, requirement for current medical therapies and most importantly because of direct risk to patient if care not provided in a hospital setting. Adeola Sam MD  11/16/2023  1:34 PM    Copy sent to Dr. Alma Caro MD    Please note that this chart was generated using voice recognition Dragon dictation software. Although every effort was made to ensure the accuracy of this automated transcription, some errors in transcription may have occurred.

## 2023-11-17 ENCOUNTER — APPOINTMENT (OUTPATIENT)
Dept: INTERVENTIONAL RADIOLOGY/VASCULAR | Age: 68
End: 2023-11-17
Payer: COMMERCIAL

## 2023-11-17 VITALS
HEIGHT: 70 IN | TEMPERATURE: 98.2 F | RESPIRATION RATE: 16 BRPM | SYSTOLIC BLOOD PRESSURE: 135 MMHG | WEIGHT: 235.01 LBS | BODY MASS INDEX: 33.64 KG/M2 | OXYGEN SATURATION: 91 % | DIASTOLIC BLOOD PRESSURE: 70 MMHG | HEART RATE: 71 BPM

## 2023-11-17 LAB
BUN SERPL-MCNC: 21 MG/DL (ref 8–23)
CREAT SERPL-MCNC: 1.1 MG/DL (ref 0.7–1.2)
GFR SERPL CREATININE-BSD FRML MDRD: >60 ML/MIN/1.73M2
GLUCOSE BLD-MCNC: 152 MG/DL (ref 75–110)
GLUCOSE BLD-MCNC: 169 MG/DL (ref 75–110)

## 2023-11-17 PROCEDURE — 02HV33Z INSERTION OF INFUSION DEVICE INTO SUPERIOR VENA CAVA, PERCUTANEOUS APPROACH: ICD-10-PCS | Performed by: RADIOLOGY

## 2023-11-17 PROCEDURE — 82565 ASSAY OF CREATININE: CPT

## 2023-11-17 PROCEDURE — 2580000003 HC RX 258: Performed by: INTERNAL MEDICINE

## 2023-11-17 PROCEDURE — C1713 ANCHOR/SCREW BN/BN,TIS/BN: HCPCS

## 2023-11-17 PROCEDURE — 6360000002 HC RX W HCPCS: Performed by: INTERNAL MEDICINE

## 2023-11-17 PROCEDURE — 36415 COLL VENOUS BLD VENIPUNCTURE: CPT

## 2023-11-17 PROCEDURE — 84520 ASSAY OF UREA NITROGEN: CPT

## 2023-11-17 PROCEDURE — 99239 HOSP IP/OBS DSCHRG MGMT >30: CPT | Performed by: INTERNAL MEDICINE

## 2023-11-17 PROCEDURE — 99232 SBSQ HOSP IP/OBS MODERATE 35: CPT | Performed by: INTERNAL MEDICINE

## 2023-11-17 PROCEDURE — 6370000000 HC RX 637 (ALT 250 FOR IP): Performed by: ORTHOPAEDIC SURGERY

## 2023-11-17 PROCEDURE — 82947 ASSAY GLUCOSE BLOOD QUANT: CPT

## 2023-11-17 PROCEDURE — 36573 INSJ PICC RS&I 5 YR+: CPT

## 2023-11-17 RX ORDER — ATORVASTATIN CALCIUM 40 MG/1
40 TABLET, FILM COATED ORAL NIGHTLY
Qty: 30 TABLET | Refills: 3 | Status: SHIPPED | OUTPATIENT
Start: 2023-11-17

## 2023-11-17 RX ADMIN — HYDROCHLOROTHIAZIDE 25 MG: 25 TABLET ORAL at 08:55

## 2023-11-17 RX ADMIN — GABAPENTIN 100 MG: 100 CAPSULE ORAL at 08:55

## 2023-11-17 RX ADMIN — PROPRANOLOL HYDROCHLORIDE 120 MG: 60 CAPSULE, EXTENDED RELEASE ORAL at 08:58

## 2023-11-17 RX ADMIN — OXYCODONE AND ACETAMINOPHEN 1 TABLET: 5; 325 TABLET ORAL at 08:55

## 2023-11-17 RX ADMIN — LOSARTAN POTASSIUM 100 MG: 50 TABLET, FILM COATED ORAL at 08:55

## 2023-11-17 RX ADMIN — CEFTRIAXONE SODIUM 2000 MG: 2 INJECTION, POWDER, FOR SOLUTION INTRAMUSCULAR; INTRAVENOUS at 12:41

## 2023-11-17 ASSESSMENT — PAIN SCALES - GENERAL: PAINLEVEL_OUTOF10: 8

## 2023-11-17 ASSESSMENT — PAIN DESCRIPTION - ORIENTATION: ORIENTATION: RIGHT

## 2023-11-17 ASSESSMENT — PAIN DESCRIPTION - DESCRIPTORS: DESCRIPTORS: ACHING

## 2023-11-17 ASSESSMENT — PAIN DESCRIPTION - LOCATION: LOCATION: KNEE

## 2023-11-17 NOTE — PROGRESS NOTES
Patient and wife were given discharge instructions and questions answered. IV was removed without complications. Personal belongings were gathered. Patient was taken down by wheelchair.

## 2023-11-17 NOTE — PROGRESS NOTES
[address]    HISTORY AND PHYSICAL EXAMINATION            Date:   11/17/2023  Patient name:  Farshad Monet  Date of admission:  11/14/2023 10:19 AM  MRN:   [id number]  Account:  [de-identified]  YOB: 1955  PCP:    Yolette Hyde MD  Room:   4580/1274-68  Code Status:    Full Code    Chief Complaint:     Chief Complaint   Patient presents with    Joint Swelling     Pt c/o right knee pain 2-3 days ago. Pt is currently on ATB for strept. History Obtained From:     patient    History of Present Illness: The patient is a 79 y.o. Non- / non  male who presents with Joint Swelling (Pt c/o right knee pain 2-3 days ago. Pt is currently on ATB for strept.)   and he is admitted to the hospital for the management of      Patient is 80-year-old male with past medical history of diabetes, hypertension presented to the ER with knee pain and swelling going on last couple of days. ,  Patient states that he was having difficulty walking around and putting weight on the knee  The pain got really worse this morning wife also noticed that knee was swollen and warm so brought him to the ER.   In the ER patient was found to have large joint effusion, had diagnostic arthrocentesis,  Concern of septic arthritis, started on antibiotics,  Ortho consulted  Patient blood pressure was borderline high in the ER secondary to pain      Past Medical History:     Past Medical History:   Diagnosis Date    Diabetes mellitus ([address])     Hyperlipidemia     Hypertension     Osteoarthritis     Psoriasis     Snores/ Sleep Apnea         Past Surgical History:     Past Surgical History:   Procedure Laterality Date    CATARACT REMOVAL WITH IMPLANT Right 02/05/2016    Raffoul/ Traumatic corneal lac repair with phaco and IOL    FOOT SURGERY Right     removal of bunion    KNEE ARTHROSCOPY Right 11/15/2023    KNEE ARTHROSCOPY WITH IRRIGATION AND DEBRIDEMENT, PARTIAL MEDIAL MENISECTOMY performed by Glory Paris MD at [address]  02/18/2016        Medications Prior to Admission:     Prior to Admission medications    Medication Sig Start Date End Date Taking? Authorizing Provider   cefTRIAXone (ROCEPHIN) infusion Infuse 2,000 mg intravenously every 24 hours Compound per protocol 11/16/23 12/30/23 Yes Sylwia Owen MD   amoxicillin-clavulanate (AUGMENTIN) 875-125 MG per tablet Take 1 tablet by mouth 2 times daily 11/10/23 11/20/23 Yes Sammy Blankenship MD   benzonatate (TESSALON) 200 MG capsule Take 1 capsule by mouth 3 times daily as needed for Cough 11/10/23 11/20/23 Yes Sammy Blankenship MD   atorvastatin (LIPITOR) 40 MG tablet Take 1 tablet by mouth daily   Yes Sammy Blankenship MD   glipiZIDE (GLUCOTROL XL) 5 MG extended release tablet TAKE 1 TABLET BY MOUTH TWICE DAILY WITH FOOD for 90    Sammy Blankenship MD   pioglitazone (ACTOS) 30 MG tablet Take 1 tablet by mouth Every Day 7/7/22   Sammy Blankenship MD   losartan (COZAAR) 100 MG tablet Take 1 tablet by mouth daily    Sammy Blankenship MD   gabapentin (NEURONTIN) 100 MG capsule 100 mg q am and 12 noon, 200 mg ahs 10/25/23   Sammy Blankenship MD   oxyCODONE-acetaminophen (PERCOCET) 5-325 MG per tablet Take 1 tablet by mouth daily as needed for Pain.  10/23/23   Sammy Blankenship MD   metFORMIN (GLUCOPHAGE) 1000 MG tablet TAKE 1 TABLET BY MOUTH TWICE DAILY WITH A MEAL 10/20/23   Sammy Blankenship MD   aspirin (BUFFERIN) 325 MG TABS 2-3 tabs Orally Once a day for 30 day(s) 4/10/23   Sammy Blankenship MD   Cholecalciferol (VITAMIN D) 10 MCG (400 UNIT) CAPS Capsule Take 1 capsule by mouth daily OTC    Sammy Blankenship MD   magnesium 30 MG tablet Take 1 tablet by mouth daily as needed OTC    Sammy Blankenship MD   propranolol (INDERAL LA) 120 MG extended release capsule Take 1 capsule by mouth daily 9/24/17   Sammy Blankenship MD   [address] ([place]

## 2023-11-17 NOTE — PROGRESS NOTES
Patient is postop day #2 arthroscopic irrigation debridement and partial meniscectomy of his right knee. Patient states that he is a little aching in the knee but otherwise doing better    Examination notes the right knee swelling down    Cultures are negative at 3 days    Patient just received PICC line. Per infectious disease the patient will have IV antibiotics through December.     Patient and his wife were advised to follow m not next week with the following week for suture removal.    Patient may be discharged at any time

## 2023-11-17 NOTE — CARE COORDINATION
ONGOING DISCHARGE PLAN:    Patient is alert and oriented x4. Spoke with patient regarding discharge plan and patient confirms that plan is still home. Christi Alvarez from [place] taught pt and spouse to independence yesterday. Will go home on IV Rocephin 2gm daily through 12/30/23. Still needs PICC, will d/c after 3pm dose of IV Rocephin. Will continue to follow for additional discharge needs. If patient is discharged prior to next notation, then this note serves as note for discharge by case management.     Electronically signed by Sandra Polo RN on 11/17/2023 at 9:59 AM

## 2023-11-19 LAB
MICROORGANISM SPEC CULT: NORMAL
MICROORGANISM/AGENT SPEC: NORMAL
SERVICE CMNT-IMP: NORMAL
SPECIMEN DESCRIPTION: NORMAL

## 2023-11-20 ENCOUNTER — TELEPHONE (OUTPATIENT)
Dept: INFECTIOUS DISEASES | Age: 68
End: 2023-11-20

## 2023-11-20 ENCOUNTER — APPOINTMENT (OUTPATIENT)
Dept: PHYSICAL THERAPY | Age: 68
End: 2023-11-20
Attending: NEUROLOGICAL SURGERY
Payer: COMMERCIAL

## 2023-11-20 NOTE — DISCHARGE SUMMARY
procedure including risks, benefits, and alternatives. Universal protocol was observed. The right arm was prepped and draped in sterile fashion using maximum sterile barrier technique. Local anesthesia was achieved with lidocaine. A micropuncture needle was used to access the right basilic vein using ultrasound guidance. An ultrasound image demonstrating patency of the vein with needle tip located within it. An image was obtained and stored in PACs. A 0.018 guidewire was used to place a peel-a-way sheath and a 5 Polish dual-lumen PICC was advanced with fluoroscopic guidance with the tip at the cavo-atrial junction. The catheter flushed easily and there was a good blood return. The catheter was secured to the skin. The patient tolerated the procedure well and there were no immediate complications. EBL: Less than 3 mL FINDINGS: Fluoroscopic image demonstrates the tip of the catheter at the cavo-atrial junction. Successful ultrasound and fluoroscopy guided PICC placement     XR KNEE RIGHT (3 VIEWS)    Result Date: 11/14/2023  EXAMINATION: THREE XRAY VIEWS OF THE RIGHT KNEE 11/14/2023 11:24 am COMPARISON: None. HISTORY: ORDERING SYSTEM PROVIDED HISTORY: warm swollen tender knee joint TECHNOLOGIST PROVIDED HISTORY: warm swollen tender knee joint Reason for Exam: warm swollen tender right knee joint, pain, limited ROM, NKI FINDINGS: There is chondrocalcinosis of the menisci noted bilaterally osteophytes are noted off the medial compartment and the patellofemoral joint. There is no evidence for any fracture. There is a large joint effusion. Large joint effusion noted. Some osteophytes noted off the medial compartment and the patellofemoral joint. Septic arthritis cannot be excluded. XR CHEST (2 VW)    Result Date: 11/10/2023  History:  Acute cough. Exam/Technique: PA and lateral views of the chest are obtained. Comparison:  Chest x-ray 12/24/2016. Findings: Cardiac size is within normal range.  Lungs are

## 2023-11-20 NOTE — TELEPHONE ENCOUNTER
Wife called to schedule hospital follow up for next week per Provider instructions upon discharge.There are no opening with provider next week. Please call patient with appropriate follow up.

## 2023-11-22 ENCOUNTER — APPOINTMENT (OUTPATIENT)
Dept: PHYSICAL THERAPY | Age: 68
End: 2023-11-22
Attending: NEUROLOGICAL SURGERY
Payer: COMMERCIAL

## 2023-11-27 ENCOUNTER — APPOINTMENT (OUTPATIENT)
Dept: PHYSICAL THERAPY | Age: 68
End: 2023-11-27
Attending: NEUROLOGICAL SURGERY
Payer: COMMERCIAL

## 2023-11-28 ENCOUNTER — OFFICE VISIT (OUTPATIENT)
Age: 68
End: 2023-11-28
Payer: COMMERCIAL

## 2023-11-28 VITALS — DIASTOLIC BLOOD PRESSURE: 91 MMHG | HEART RATE: 72 BPM | SYSTOLIC BLOOD PRESSURE: 162 MMHG

## 2023-11-28 DIAGNOSIS — M71.38 SYNOVIAL CYST OF LUMBAR SPINE: ICD-10-CM

## 2023-11-28 DIAGNOSIS — M48.062 LUMBAR STENOSIS WITH NEUROGENIC CLAUDICATION: Primary | ICD-10-CM

## 2023-11-28 PROCEDURE — 1111F DSCHRG MED/CURRENT MED MERGE: CPT | Performed by: NEUROLOGICAL SURGERY

## 2023-11-28 PROCEDURE — 99213 OFFICE O/P EST LOW 20 MIN: CPT | Performed by: NEUROLOGICAL SURGERY

## 2023-11-28 PROCEDURE — 1036F TOBACCO NON-USER: CPT | Performed by: NEUROLOGICAL SURGERY

## 2023-11-28 PROCEDURE — 3017F COLORECTAL CA SCREEN DOC REV: CPT | Performed by: NEUROLOGICAL SURGERY

## 2023-11-28 PROCEDURE — G8417 CALC BMI ABV UP PARAM F/U: HCPCS | Performed by: NEUROLOGICAL SURGERY

## 2023-11-28 PROCEDURE — G8427 DOCREV CUR MEDS BY ELIG CLIN: HCPCS | Performed by: NEUROLOGICAL SURGERY

## 2023-11-28 PROCEDURE — G8484 FLU IMMUNIZE NO ADMIN: HCPCS | Performed by: NEUROLOGICAL SURGERY

## 2023-11-28 PROCEDURE — 3077F SYST BP >= 140 MM HG: CPT | Performed by: NEUROLOGICAL SURGERY

## 2023-11-28 PROCEDURE — 3080F DIAST BP >= 90 MM HG: CPT | Performed by: NEUROLOGICAL SURGERY

## 2023-11-28 PROCEDURE — 1123F ACP DISCUSS/DSCN MKR DOCD: CPT | Performed by: NEUROLOGICAL SURGERY

## 2023-11-28 RX ORDER — ATORVASTATIN CALCIUM 40 MG/1
TABLET, FILM COATED ORAL
COMMUNITY

## 2023-11-28 NOTE — PROGRESS NOTES
1 [facility] NEUROSURGERY  [address] [place] [address]. [place], [address] [place] [address]  Dept: [phone]  Dept Fax: [phone]     Patient:  Kush Hinton  YOB: 1955  Date: 11/28/23      Chief Complaint   Patient presents with    Follow-up     4wk f/u after PT. Right knee surgery (septic joint) 2wks ago 11/15/23           HPI:     Mr. Indra Lindsey returns the office for further evaluation of lower back and lower extremity pain. I had last seen him on October 31 with these complaints. At that time he wanted to try some physical therapy, and an order was provided. He had been to about 2-3 sessions when he awoke 1 day with an acute episode of severe pain in the right knee. This was found to be a septic joint, and he underwent surgery. He is now on long-term IV antibiotics with a PICC line. He has not been able to return to aquatic therapy because of his incision and PICC line. Interestingly, his back and other lower extremity pain has resolved. He is not sure if this is because he is not up and around as much or not, but he has not been having any difficulty with back pain at all. He had also been having some intermittent spasms, which have also resolved. Physical Exam:      BP (!) 162/91   Pulse 72     He is awake, alert, and in no acute distress. He answers questions appropriately with clear and fluent speech. His cranial nerves are grossly intact. He is moving his extremities without gross deficit. He has a PICC line in the right upper extremity. He is using a cane to aid in ambulation. Imaging: I again briefly reviewed his MRI from [facility]. This was done on October 16. He does have diffuse degenerative change, with stenosis that is most severe at L2-3. There is also a small synovial cyst at L4-5 causing some stenosis. Assessment and Plan:     1.  Lumbar stenosis with neurogenic

## 2023-11-29 ENCOUNTER — OFFICE VISIT (OUTPATIENT)
Dept: INFECTIOUS DISEASES | Age: 68
End: 2023-11-29
Payer: COMMERCIAL

## 2023-11-29 VITALS
TEMPERATURE: 98.2 F | SYSTOLIC BLOOD PRESSURE: 170 MMHG | RESPIRATION RATE: 17 BRPM | HEART RATE: 76 BPM | WEIGHT: 242 LBS | OXYGEN SATURATION: 98 % | HEIGHT: 70 IN | DIASTOLIC BLOOD PRESSURE: 90 MMHG | BODY MASS INDEX: 34.65 KG/M2

## 2023-11-29 DIAGNOSIS — M00.9 PYOGENIC ARTHRITIS OF KNEE, DUE TO UNSPECIFIED ORGANISM, UNSPECIFIED LATERALITY (HCC): Primary | ICD-10-CM

## 2023-11-29 PROCEDURE — 1036F TOBACCO NON-USER: CPT | Performed by: INTERNAL MEDICINE

## 2023-11-29 PROCEDURE — 3079F DIAST BP 80-89 MM HG: CPT | Performed by: INTERNAL MEDICINE

## 2023-11-29 PROCEDURE — 3077F SYST BP >= 140 MM HG: CPT | Performed by: INTERNAL MEDICINE

## 2023-11-29 PROCEDURE — G8417 CALC BMI ABV UP PARAM F/U: HCPCS | Performed by: INTERNAL MEDICINE

## 2023-11-29 PROCEDURE — G8484 FLU IMMUNIZE NO ADMIN: HCPCS | Performed by: INTERNAL MEDICINE

## 2023-11-29 PROCEDURE — 3017F COLORECTAL CA SCREEN DOC REV: CPT | Performed by: INTERNAL MEDICINE

## 2023-11-29 PROCEDURE — 1123F ACP DISCUSS/DSCN MKR DOCD: CPT | Performed by: INTERNAL MEDICINE

## 2023-11-29 PROCEDURE — G8427 DOCREV CUR MEDS BY ELIG CLIN: HCPCS | Performed by: INTERNAL MEDICINE

## 2023-11-29 PROCEDURE — 1111F DSCHRG MED/CURRENT MED MERGE: CPT | Performed by: INTERNAL MEDICINE

## 2023-11-29 PROCEDURE — 99214 OFFICE O/P EST MOD 30 MIN: CPT | Performed by: INTERNAL MEDICINE

## 2023-11-29 RX ORDER — LORATADINE 10 MG/1
CAPSULE, LIQUID FILLED ORAL
COMMUNITY
Start: 2014-08-29

## 2023-11-29 RX ORDER — OMEGA-3 FATTY ACIDS/FISH OIL 300-1000MG
CAPSULE ORAL
COMMUNITY
Start: 2014-08-29

## 2023-11-29 RX ORDER — OMEGA-3/DHA/EPA/FISH OIL 60 MG-90MG
1 CAPSULE ORAL
COMMUNITY

## 2023-11-29 RX ORDER — LORAZEPAM 1 MG/1
TABLET ORAL
COMMUNITY
Start: 2023-10-13

## 2023-11-30 ENCOUNTER — OFFICE VISIT (OUTPATIENT)
Dept: ORTHOPEDIC SURGERY | Age: 68
End: 2023-11-30

## 2023-11-30 ENCOUNTER — APPOINTMENT (OUTPATIENT)
Dept: PHYSICAL THERAPY | Age: 68
End: 2023-11-30
Attending: NEUROLOGICAL SURGERY
Payer: COMMERCIAL

## 2023-11-30 VITALS — RESPIRATION RATE: 16 BRPM | HEIGHT: 70 IN | BODY MASS INDEX: 33.64 KG/M2 | WEIGHT: 235 LBS

## 2023-11-30 DIAGNOSIS — S83.241D OTHER TEAR OF MEDIAL MENISCUS OF RIGHT KNEE AS CURRENT INJURY, SUBSEQUENT ENCOUNTER: Primary | ICD-10-CM

## 2023-11-30 PROCEDURE — 99024 POSTOP FOLLOW-UP VISIT: CPT | Performed by: PHYSICIAN ASSISTANT

## 2023-11-30 NOTE — PROGRESS NOTES
Patient returns today status post right knee arthroscopy, incision and debridement with partial (medial) meniscectomy. Patient has no major complaints other than expected tightness/swelling with ROM. Sharp/stabbing pain has improved. Patient is currently seeing infectious disease and is on IV vancomycin daily and tolerating well. He does note some postoperative swelling of the knee but denies any recurrence of redness, warmth and no fever or chills. He reports his pain is improving as anticipated. On exam, portal sites are without redness or drainage. No calf tenderness; negative Young's sign. Motion is 0-100 degrees. Small effusion present    Assessment  Status post right knee arthroscopy due to presumed septic right knee and patient was found to have medial meniscus tear intraoperatively. Plan  Patient given exercises to perform. Patient given activities/ motions to complete. Continue activities at home. Return to work. RTO PRN. Call with any future problems.

## 2023-12-26 ENCOUNTER — TELEPHONE (OUTPATIENT)
Dept: INFECTIOUS DISEASES | Age: 68
End: 2023-12-26

## 2023-12-26 NOTE — TELEPHONE ENCOUNTER
Dr. Fide Guerra from [facility]:     12/26/2023 3:04 PM  That is okay last dose can be changed to Friday 29 ,remove line after last dose

## 2023-12-26 NOTE — TELEPHONE ENCOUNTER
Patient's wife notified. Calling [facility] at [phone] to notify them of order change. Notified nurse Ana Lewis at [facility]. She verbally understands last dose 12/29 and pull line after last dose.

## 2023-12-26 NOTE — TELEPHONE ENCOUNTER
Wife called stating patient is prescribed Rocephin by Dr. Lynn Garces and last dose is supposed to be 12/30/23. Wife states she gives the doses at home but goes to [facility] to get dressing changes. Since last dose is on as Saturday, they will not pull his PICC line until Tuesday 1/2/24. Patient's wife is asking if the last dose could be 12/29/23 so they can get his line pulled on the Friday instead of having to wait throughout the weekend or having to go to the ER. Please advise.

## 2024-01-03 ENCOUNTER — OFFICE VISIT (OUTPATIENT)
Dept: INFECTIOUS DISEASES | Age: 69
End: 2024-01-03
Payer: MEDICARE

## 2024-01-03 VITALS
WEIGHT: 250 LBS | HEART RATE: 68 BPM | SYSTOLIC BLOOD PRESSURE: 128 MMHG | HEIGHT: 70 IN | BODY MASS INDEX: 35.79 KG/M2 | OXYGEN SATURATION: 95 % | DIASTOLIC BLOOD PRESSURE: 74 MMHG | TEMPERATURE: 97.9 F

## 2024-01-03 DIAGNOSIS — M00.9 PYOGENIC ARTHRITIS OF KNEE, DUE TO UNSPECIFIED ORGANISM, UNSPECIFIED LATERALITY (HCC): Primary | ICD-10-CM

## 2024-01-03 PROCEDURE — 3074F SYST BP LT 130 MM HG: CPT | Performed by: INTERNAL MEDICINE

## 2024-01-03 PROCEDURE — G8428 CUR MEDS NOT DOCUMENT: HCPCS | Performed by: INTERNAL MEDICINE

## 2024-01-03 PROCEDURE — G8484 FLU IMMUNIZE NO ADMIN: HCPCS | Performed by: INTERNAL MEDICINE

## 2024-01-03 PROCEDURE — 1036F TOBACCO NON-USER: CPT | Performed by: INTERNAL MEDICINE

## 2024-01-03 PROCEDURE — G8417 CALC BMI ABV UP PARAM F/U: HCPCS | Performed by: INTERNAL MEDICINE

## 2024-01-03 PROCEDURE — 3078F DIAST BP <80 MM HG: CPT | Performed by: INTERNAL MEDICINE

## 2024-01-03 PROCEDURE — 3017F COLORECTAL CA SCREEN DOC REV: CPT | Performed by: INTERNAL MEDICINE

## 2024-01-03 PROCEDURE — 1123F ACP DISCUSS/DSCN MKR DOCD: CPT | Performed by: INTERNAL MEDICINE

## 2024-01-03 PROCEDURE — 99214 OFFICE O/P EST MOD 30 MIN: CPT | Performed by: INTERNAL MEDICINE

## 2024-01-03 RX ORDER — LOSARTAN POTASSIUM AND HYDROCHLOROTHIAZIDE 25; 100 MG/1; MG/1
1 TABLET ORAL DAILY
COMMUNITY

## 2024-01-03 NOTE — PROGRESS NOTES
[facility] -   Infectious diseases evaluation  admission date 11/14/2023      Impression :   Current:  Right knee septic arthritis status post debridement 11/15/2023, no growth on culture  Recent Streptococcus pharyngitis  Diabetes mellitus  Psoriasis  Hyperlipidemia    Recommendations     IV ceftriaxone course completed 12/30/2023   CBC, C-reactive protein and sedimentation rate  Follow-up in 2 months and as needed      History of Present Illness:   Initial history:  Abdoulaye Rodriguez is a 68 y.o.-year-old male was hospitalized recently for worsening right knee swelling, redness and pain for 2 days associated with decreased range of motion and difficulty to walk.  Symptoms moderate severe, worse with movement, no alleviating factors.  He was found to have large joint effusion status post arthrocentesis on 11/14/2023 that was cloudy 6, 619 WBC, 90% neutrophil, no crystals, no growth on culture.  He had surgical debridement done 11/15/2023  The patient was discharged on IV ceftriaxone.  The patient was recently diagnosed with a strep pharyngitis was taking oral Augmentin.  No growth on right knee fluid culture from 11/14/2023  Interval history 11/29/2023  He is feeling well today, right knee swelling and redness resolved, no significant pain, denied fever or chills, no diarrhea, no other complaints.  He is tolerating IV ceftriaxone    Interval history 1/3/2024  He is feeling well today, had intermittent mild pain to the right knee, no swelling or redness, good range of motion, denied fever or chills, no other complaints.  Patient Vitals for the past 8 hrs:   BP Temp Pulse Resp SpO2   11/17/23 0708 135/70 98.2 °F (36.8 °C) 71 17 91 %             I have personally reviewed the past medical history, past surgical history, medications, social history, and family history, and I haveupdated the database accordingly.      Allergies:   Patient has no known allergies.     Review

## 2024-01-17 NOTE — THERAPY DISCHARGE
[] [facility]  Rehabilitation Services  [address]  [place], [place] [address]  Phone [phone]  Fax [phone]    Physical Therapy Discharge Note    Date: 2024      Patient: Abdoulaye Rodriguez  : 1955  MRN: [id number]    Physician: James Street MD                      Insurance: [facility] ( visits remaining)  Medical Diagnosis: M48.062 (ICD-10-CM) - Lumbar stenosis with neurogenic claudication      Rehab Codes: R25 pain , M25.60 stiffness, R53.1 weakness   Onset Date: 23                 Next 's appt: 23-neurosurgery  Total visits attended: 3/10  Cancels/No shows:    Date of initial visit: 23                     Talked with pt's wife on 11/15/23. Decided to hold PT at that time as pt was hospitalized for septic arthritis. Has been extended time with no follow up. Due to no follow up and extended time since last visit, will close this episode of care.        Treatment Included:     [x] Therapeutic Exercise   81412  [] Iontophoresis: 4 mg/mL Dexamethasone Sodium Phosphate  mAmin  28551   [x] Therapeutic Activity  66200 [] Vasopneumatic cold with compression  19436    [] Gait Training   88780 [] Ultrasound   16399   [] Neuromuscular Re-education  95019 [] Electrical Stimulation Unattended  64021   [] Manual Therapy  59203 [] Electrical Stimulation Attended  98441   [x] Instruction in HEP  [] Lumbar/Cervical Traction  93931   [x] Aquatic Therapy   70458 [] Cold/hotpack    [] Massage   88263      [] Dry Needling, 1 or 2 muscles  18973   [] Biofeedback, first 15 minutes   19881  [] Biofeedback, additional 15 minutes   42689 [] Dry Needling, 3 or more muscles                  If you have any questions or concerns regarding this patient's care, please contact us.   Thank you for your referral.      Electronically signed by: Álvaro Newby PT

## 2024-01-23 ENCOUNTER — OFFICE VISIT (OUTPATIENT)
Age: 69
End: 2024-01-23
Payer: MEDICARE

## 2024-01-23 VITALS — SYSTOLIC BLOOD PRESSURE: 130 MMHG | DIASTOLIC BLOOD PRESSURE: 72 MMHG | HEART RATE: 61 BPM

## 2024-01-23 DIAGNOSIS — M71.38 SYNOVIAL CYST OF LUMBAR SPINE: ICD-10-CM

## 2024-01-23 DIAGNOSIS — M48.062 LUMBAR STENOSIS WITH NEUROGENIC CLAUDICATION: Primary | ICD-10-CM

## 2024-01-23 PROCEDURE — 1036F TOBACCO NON-USER: CPT | Performed by: NEUROLOGICAL SURGERY

## 2024-01-23 PROCEDURE — G8417 CALC BMI ABV UP PARAM F/U: HCPCS | Performed by: NEUROLOGICAL SURGERY

## 2024-01-23 PROCEDURE — 99214 OFFICE O/P EST MOD 30 MIN: CPT | Performed by: NEUROLOGICAL SURGERY

## 2024-01-23 PROCEDURE — G8427 DOCREV CUR MEDS BY ELIG CLIN: HCPCS | Performed by: NEUROLOGICAL SURGERY

## 2024-01-23 PROCEDURE — 3078F DIAST BP <80 MM HG: CPT | Performed by: NEUROLOGICAL SURGERY

## 2024-01-23 PROCEDURE — 3075F SYST BP GE 130 - 139MM HG: CPT | Performed by: NEUROLOGICAL SURGERY

## 2024-01-23 PROCEDURE — G8484 FLU IMMUNIZE NO ADMIN: HCPCS | Performed by: NEUROLOGICAL SURGERY

## 2024-01-23 PROCEDURE — 1123F ACP DISCUSS/DSCN MKR DOCD: CPT | Performed by: NEUROLOGICAL SURGERY

## 2024-01-23 PROCEDURE — 3017F COLORECTAL CA SCREEN DOC REV: CPT | Performed by: NEUROLOGICAL SURGERY

## 2024-01-23 RX ORDER — TIZANIDINE 2 MG/1
2 TABLET ORAL EVERY 8 HOURS PRN
Qty: 30 TABLET | Refills: 2 | Status: SHIPPED | OUTPATIENT
Start: 2024-01-23

## 2024-01-24 NOTE — PROGRESS NOTES
he completes the blood testing so we can review it in a timely fashion.  Because it was ordered by another provider would not automatically be forwarded to me.  He is welcome to contact me in the meantime with any questions or concerns.      Electronically signed by James Street MD on 1/23/2024 at 11:51 PM    Please note that this chart was generated using voice recognition Dragon dictation software.  Although every effort was made to ensure the accuracy of this automated transcription, some errors in transcription may have occurred.

## 2024-01-26 ENCOUNTER — HOSPITAL ENCOUNTER (OUTPATIENT)
Age: 69
Discharge: HOME | End: 2024-01-26
Payer: MEDICARE

## 2024-01-26 DIAGNOSIS — M00.9 PYOGENIC ARTHRITIS OF KNEE, DUE TO UNSPECIFIED ORGANISM, UNSPECIFIED LATERALITY (HCC): ICD-10-CM

## 2024-01-26 LAB
CRP SERPL HS-MCNC: <3 MG/L (ref 0–5)
ERYTHROCYTE [DISTWIDTH] IN BLOOD BY AUTOMATED COUNT: 13.9 % (ref 11.5–14.9)
ERYTHROCYTE [SEDIMENTATION RATE] IN BLOOD BY PHOTOMETRIC METHOD: 3 MM/HR (ref 0–20)
HCT VFR BLD AUTO: 38.5 % (ref 41–53)
HGB BLD-MCNC: 12.9 G/DL (ref 13.5–17.5)
MCH RBC QN AUTO: 29.8 PG (ref 26–34)
MCHC RBC AUTO-ENTMCNC: 33.4 G/DL (ref 31–37)
MCV RBC AUTO: 89.1 FL (ref 80–100)
PLATELET # BLD AUTO: 226 K/UL (ref 150–450)
PMV BLD AUTO: 7.1 FL (ref 6–12)
RBC # BLD AUTO: 4.33 M/UL (ref 4.5–5.9)
WBC OTHER # BLD: 6 K/UL (ref 3.5–11)

## 2024-01-26 PROCEDURE — 86140 C-REACTIVE PROTEIN: CPT

## 2024-01-26 PROCEDURE — 85652 RBC SED RATE AUTOMATED: CPT

## 2024-01-26 PROCEDURE — 36415 COLL VENOUS BLD VENIPUNCTURE: CPT

## 2024-01-26 PROCEDURE — 85027 COMPLETE CBC AUTOMATED: CPT

## 2024-01-30 ENCOUNTER — TELEPHONE (OUTPATIENT)
Age: 69
End: 2024-01-30

## 2024-01-30 NOTE — TELEPHONE ENCOUNTER
PC to let pt know the repeat labs are normal. Per his wife he is having good and bad days. PT eval is tomorrow. Will let us know if needs to come in sooner than 2/22/24 follow up. JOEL Obrien RN

## 2024-01-31 ENCOUNTER — HOSPITAL ENCOUNTER (OUTPATIENT)
Dept: PHYSICAL THERAPY | Age: 69
Setting detail: THERAPIES SERIES
Discharge: HOME | End: 2024-01-31
Attending: NEUROLOGICAL SURGERY
Payer: MEDICARE

## 2024-01-31 PROCEDURE — 97110 THERAPEUTIC EXERCISES: CPT

## 2024-01-31 PROCEDURE — 97163 PT EVAL HIGH COMPLEX 45 MIN: CPT

## 2024-01-31 NOTE — THERAPY EVALUATION
Stimulation Attended  84898   [x] Instruction in HEP  [] Lumbar/Cervical Traction  93964   [x] Aquatic Therapy   27546 [] Cold/hotpack    [] Massage   05905      [] Dry Needling, 1 or 2 muscles  20560   [] Biofeedback, first 15 minutes   90912  [] Biofeedback, additional 15 minutes   90913 [] Dry Needling, 3 or more muscles  20561       Frequency: 2 x/week for 10 visits    Today’s Treatment:  INTERVENTIONS  Reps/ Time Weight/ Level Completed  Today Comments          MODALITIES                      MANUAL                      EXERCISES        Standing wall calf stretch 30\" ea  X    Seated hamstring stretch 30\" ea  X    Seated figure 4  30s ea   x      Patent Education/Home program:   1/31: Access Code: [email or web address] // Exercises  - Seated Hamstring Stretch (BKA)  - 2-3 x daily - 7 x weekly - 5 sets - 15-20 seconds hold  - Standing Gastroc Stretch on Step  - 2-3 x daily - 7 x weekly - 5 sets - 15-20 seconds hold  - Seated Figure 4 Piriformis Stretch  - 2-3 x daily - 7 x weekly - 5 sets - 15-20 seconds hold    Specific Instructions for next treatment:   - stretches to tolerance, may need to be semi-reclined   - core strengthening to tolerance   - can try some manual to the lumbar region if able       Treatment Charges: Mins Units   [] Evaluation       []  Low       []  Moderate       [x]  High 47 1   []  Modalities     [x]  Ther Exercise 10 1   []  Manual Therapy     []  Ther Activities     []  Aquatics     []  Neuromuscular     []  Gait Training     []  Dry Needling           1-2 muscles     []  Dry Needling           3 or more muscles     [] Vasocompression     []  Other       Time in:3:45p    Time Out: 3:42  TOTAL  TIME: 57 min   Total billable time: 10 min    Electronically signed by: Álvaro Newby PT    Thank you for referring this patient to physical therapy. Please feel free to contact with any questions or concerns with plan of care.    Physician

## 2024-02-07 ENCOUNTER — HOSPITAL ENCOUNTER (OUTPATIENT)
Dept: PHYSICAL THERAPY | Age: 69
Setting detail: THERAPIES SERIES
Discharge: HOME | End: 2024-02-07
Attending: NEUROLOGICAL SURGERY
Payer: MEDICARE

## 2024-02-07 PROCEDURE — 97110 THERAPEUTIC EXERCISES: CPT

## 2024-02-07 NOTE — FLOWSHEET NOTE
improve modified YAKOV score to <50% in order to improve quality of life and improve ability to participate in housekeeping activities and other activities throughout the community.  Pt will self report worst pain no greater than 6/10 in lumbar spine in order to better tolerate ADLs with minimal dysfunction  Pt will improve AROM in lumbar spine to at least 50% in all directions in order to demonstrate ability to move/reach in all planes unrestricted at PLOF  Pt will demonstrate independence with a long term HEP for continued progress/maintenance after completion of PT    Pt. Education:  [x] Yes  [] No  [x] Reviewed Prior HEP/Ed  Method of Education: [x] Verbal  [x] Demo  [] Written  Comprehension of Education:  [x] Verbalizes understanding.  [x] Demonstrates understanding.  [] Needs review.  [] Demonstrates/verbalizes HEP/Ed previously given.     Plan: [x] Continue per plan of care.   [] Other:      Treatment Charges: Mins Units   []  Modalities     [x]  Ther Exercise 37 3   []  Manual Therapy 5 0   []  Ther Activities     []  Aquatics     []  Neuromuscular     [] Vasocompression     [] Gait Training     [] Dry needling        [] 1 or 2 muscles        [] 3 or more muscles     []  Other     Total Billable time 42 3     Time In: 1035            Time Out: 1117    Electronically signed by:  Kvng Hernandez PTA

## 2024-02-08 ENCOUNTER — OFFICE VISIT (OUTPATIENT)
Age: 69
End: 2024-02-08
Payer: MEDICARE

## 2024-02-08 VITALS — DIASTOLIC BLOOD PRESSURE: 68 MMHG | HEART RATE: 63 BPM | SYSTOLIC BLOOD PRESSURE: 128 MMHG

## 2024-02-08 DIAGNOSIS — M71.38 SYNOVIAL CYST OF LUMBAR SPINE: ICD-10-CM

## 2024-02-08 DIAGNOSIS — M00.9 PYOGENIC ARTHRITIS OF RIGHT KNEE JOINT, DUE TO UNSPECIFIED ORGANISM (HCC): ICD-10-CM

## 2024-02-08 DIAGNOSIS — M48.062 LUMBAR STENOSIS WITH NEUROGENIC CLAUDICATION: Primary | ICD-10-CM

## 2024-02-08 PROCEDURE — 1123F ACP DISCUSS/DSCN MKR DOCD: CPT | Performed by: NEUROLOGICAL SURGERY

## 2024-02-08 PROCEDURE — 3074F SYST BP LT 130 MM HG: CPT | Performed by: NEUROLOGICAL SURGERY

## 2024-02-08 PROCEDURE — 1036F TOBACCO NON-USER: CPT | Performed by: NEUROLOGICAL SURGERY

## 2024-02-08 PROCEDURE — 3017F COLORECTAL CA SCREEN DOC REV: CPT | Performed by: NEUROLOGICAL SURGERY

## 2024-02-08 PROCEDURE — G8484 FLU IMMUNIZE NO ADMIN: HCPCS | Performed by: NEUROLOGICAL SURGERY

## 2024-02-08 PROCEDURE — 3078F DIAST BP <80 MM HG: CPT | Performed by: NEUROLOGICAL SURGERY

## 2024-02-08 PROCEDURE — G8427 DOCREV CUR MEDS BY ELIG CLIN: HCPCS | Performed by: NEUROLOGICAL SURGERY

## 2024-02-08 PROCEDURE — 99213 OFFICE O/P EST LOW 20 MIN: CPT | Performed by: NEUROLOGICAL SURGERY

## 2024-02-08 PROCEDURE — G8417 CALC BMI ABV UP PARAM F/U: HCPCS | Performed by: NEUROLOGICAL SURGERY

## 2024-02-09 ENCOUNTER — HOSPITAL ENCOUNTER (OUTPATIENT)
Dept: PHYSICAL THERAPY | Age: 69
Setting detail: THERAPIES SERIES
Discharge: HOME | End: 2024-02-09
Attending: NEUROLOGICAL SURGERY
Payer: MEDICARE

## 2024-02-09 PROCEDURE — 97110 THERAPEUTIC EXERCISES: CPT

## 2024-02-09 NOTE — PROGRESS NOTES
[facility], [facility], [facility] NEUROSURGERY  [address]  [place] [id number]  Dept: [phone]  Dept Fax: [phone]     Patient:  Abdoulaye Rodriguez  YOB: 1955  Date: 2/9/24      Chief Complaint   Patient presents with    Follow-up     Follow up after PT           HPI:     Mr. Rodriguez presents to the office for further evaluation of difficulty with pain in the lower back radiating into the lower extremities.  He was last seen on January 23.  He was due for follow-up in a couple of weeks, but his pain has been worsening significantly since he was here.  He also was treated for a septic joint in the right knee.  His antibiotics ended around the end of December.  I had him go for a CRP, sedimentation rate, basic metabolic profile, and complete blood count at his previous appointment.  The inflammatory markers were all within normal limits and his white count was normal.        Physical Exam:      /68 (Site: Left Upper Arm, Position: Sitting)   Pulse 63     He is awake, alert, and in no acute distress.  He answers questions appropriately with clear and fluent speech.  His cranial nerves are grossly intact.  I did not repeat formal motor testing today, but he is moving his extremities without focal deficit.  His gait is antalgic, but steady.    Imaging: We briefly reviewed an MRI of the lumbar spine.  There are several abnormal findings, but the most pronounced is severe stenosis at L2-3, as well as a small synovial cyst projecting medially from the left L4-5 facet.    Assessment and Plan:     1. Lumbar stenosis with neurogenic claudication    2. Synovial cyst of lumbar spine    3. Pyogenic arthritis of right knee joint, due to unspecified organism (HCC)        Mr. Rodriguez is describing significantly worsening pain in the lower back and lower extremities.  He did have recent septic arthritis in the right knee.

## 2024-02-09 NOTE — FLOWSHEET NOTE
stretches to tolerance, may need to be semi-reclined   - core strengthening to tolerance   - can try some manual to the lumbar region if able  - possibly attempt standing core stabilization next visit if patient is able.       Assessment: [x] Progressing toward goals.  Continued with stretching and core stabilization exercises to patient's tolerance.  Good tolerance to exercises performed today.  Able to add hip abduction and TrA activation with UE flexion to continue to work on core stability.  Forward flexion over the ball feels good/stretches in lumbar back without pain.  Good tolerance to all exercises with no increased pain at end of treatment.  Improved gait pattern but still forward flexed slightly to the right but better anthony/step length and increased speed.      [] No change.     [] Other:    [x] Patient would continue to benefit from skilled physical therapy services in order to: reduce his pain, reduce his tightness/stiffness in his lumbar region and in B LE, increase his B LE strength, increase his sleep quality, and increase his ability to function independently in the community and at home.      STG/LTG: (to be met in 10 treatments)  Pt will demonstrate improved B LE strength to 5/5 in order to demonstrate improved stability/strength necessary for unrestricted ADLs.  Pt will be able to activate TrAB with movements showing better core stability needed to protect the lumbar spine.   Pt will self report worst pain no greater than 2/10 in order to better tolerate ADLs with minimal dysfunction.  Pt to be able to tolerate standing for 5 minutes without increase in LBP/B LE pain in order to improve ability to function throughout the community and at home.   Pt will demonstrate awareness of lumbar protection strategies with bed mobility, bending/reaching, and lifting to reduce strain to spine and complete ADLs with more stability, reducing dysfunction.    Pt to improve modified YAKOV score to <50% in order to

## 2024-02-13 ENCOUNTER — HOSPITAL ENCOUNTER (OUTPATIENT)
Dept: PHYSICAL THERAPY | Age: 69
Setting detail: THERAPIES SERIES
Discharge: HOME | End: 2024-02-13
Attending: NEUROLOGICAL SURGERY
Payer: MEDICARE

## 2024-02-13 PROCEDURE — 97110 THERAPEUTIC EXERCISES: CPT

## 2024-02-13 NOTE — FLOWSHEET NOTE
[x] [facility]   Outpatient Rehabilitation & Therapy  [address]  P: [phone]   F: [phone]    Physical Therapy Daily Treatment Note    Date:  2024  Patient Name:  Abdoulaye Rodriguez    :  1955  MRN: [id number]  Physician: James Street MD                      Insurance: Medicare/[facility] [employer] [place]   Medical Diagnosis: M48.062 (ICD-10-CM) - Lumbar stenosis with neurogenic claudication      Rehab Codes: R25 pain , M25.60 stiffness, R53.1 weakness   Onset Date: 23                 Next 's appt: 24-neurosurgery  Visit# / total visits: 4/10  Cancels/No Shows: 0/0    Subjective:   Pt notes he is about the same. He was sore after last session. He is getting an MRI tonight and will follow up with neurosurgery on . Feels today is \"not bad\" with radicular pain. He is feeling it more in the tailbone with radiating outward. Feels he might have overdone it with activity yesterday.       Pain:  [x] Yes  [] No Location: Lumbar back radiating down (B) LEs into thighs (L>R) and calves.   Pain Rating: (0-10 scale) 5/10  Pain altered Tx:  [x] No  [] Yes  Action:  Comments:    Objective:  Modalities:   Precautions: Pain with all movements  Exercises:  INTERVENTIONS  Reps/ Time Weight/ Level Completed  Today Comments               MODALITIES                                    MANUAL             STM to lumbar back   8'      seated leaning forward onto knees T12-L5 paraspinals                EXERCISES            Standing wall calf stretch 30\" ea x2         Seated hamstring stretch 30\" ea x3   X     Seated figure 4  30s ea x2   x     TrA activation  10x3\" hold  x    TrA marching (B)  10x  ea  x    TrA w/ knee ext 10x ea  x    TrA With UE elevation 10x  #3 x    Forward flex stretch w/ blue ball 10x5\"  x    Left/right bias forward flex w/ blue ball 10x5\" ea  x    Seated Tband rows/pull downs 15x2 ea Yellow x    Seated (B) hip abd  15x2 Red x    Seated adduction  15x3s

## 2024-02-16 ENCOUNTER — HOSPITAL ENCOUNTER (OUTPATIENT)
Dept: PHYSICAL THERAPY | Age: 69
Setting detail: THERAPIES SERIES
Discharge: HOME | End: 2024-02-16
Attending: NEUROLOGICAL SURGERY
Payer: MEDICARE

## 2024-02-16 PROCEDURE — 97113 AQUATIC THERAPY/EXERCISES: CPT

## 2024-02-16 PROCEDURE — 97110 THERAPEUTIC EXERCISES: CPT

## 2024-02-16 NOTE — FLOWSHEET NOTE
[x] [facility]   Outpatient Rehabilitation & Therapy  [address]  P: [phone]   F: [phone]    Physical Therapy Daily Treatment Note    Date:  2024  Patient Name:  Abdoulaye Rodriguez    :  1955  MRN: [id number]  Physician: James Street MD                      Insurance: Medicare/[facility] [employer] [place]   Medical Diagnosis: M48.062 (ICD-10-CM) - Lumbar stenosis with neurogenic claudication      Rehab Codes: R25 pain , M25.60 stiffness, R53.1 weakness   Onset Date: 23                 Next 's appt: 24-neurosurgery  Visit# / total visits: 5/10  Cancels/No Shows: 0/0    Subjective:   Pt notes increased pain during MRI having to rest every little movement they asked him to day.  Able to relax and make it thru the MRI with controlled breathing.  States pain more in tailbone area and around into anterior left thigh.  States he continues to stay as active as he tolerate to avoid just sitting around.     Pain:  [x] Yes  [] No Location: Lumbar back radiating down (L) thigh and at tailbone.   Pain Rating: (0-10 scale) 5-6/10  Pain altered Tx:  [x] No  [] Yes  Action:  Comments:    Objective:  Modalities:   Precautions: Pain with all movements  Exercises:  INTERVENTIONS  Reps/ Time Weight/ Level Completed  Today Comments               MODALITIES                                    MANUAL             STM to lumbar back   8'      seated leaning forward onto knees T12-L5 paraspinals                EXERCISES            Standing wall calf stretch 30\" ea x2         Seated hamstring stretch 30\" ea x3   X     Seated figure 4  30s ea x2   x     TrA activation  10x3\" hold  x    TrA marching (B)  10x  ea  x    TrA w/ knee ext 10x ea  x    TrA With UE elevation 10x  #3 x Alt   Forward flex stretch w/ blue ball 10x5\"  x    Left/right bias forward flex w/ blue ball 10x5\" ea  x    Seated Tband rows/pull downs 15x2 ea Yellow x    Seated (B) hip abd  15x2 Red x    Seated adduction  15x3s

## 2024-02-20 ENCOUNTER — APPOINTMENT (OUTPATIENT)
Dept: PHYSICAL THERAPY | Age: 69
End: 2024-02-20
Attending: NEUROLOGICAL SURGERY
Payer: MEDICARE

## 2024-02-21 ENCOUNTER — HOSPITAL ENCOUNTER (OUTPATIENT)
Dept: PHYSICAL THERAPY | Age: 69
Setting detail: THERAPIES SERIES
Discharge: HOME | End: 2024-02-21
Attending: NEUROLOGICAL SURGERY
Payer: MEDICARE

## 2024-02-21 PROCEDURE — 97110 THERAPEUTIC EXERCISES: CPT

## 2024-02-21 NOTE — FLOWSHEET NOTE
[x] [facility]   Outpatient Rehabilitation & Therapy  [address]  P: [phone]   F: [phone]    Physical Therapy Daily Treatment Note    Date:  2024  Patient Name:  Abdoulaye Rodriguez    :  1955  MRN: [id number]  Physician: James Street MD                      Insurance: Medicare/[facility] [employer] [place]   Medical Diagnosis: M48.062 (ICD-10-CM) - Lumbar stenosis with neurogenic claudication      Rehab Codes: R25 pain , M25.60 stiffness, R53.1 weakness   Onset Date: 23                 Next 's appt: 24-neurosurgery  Visit# / total visits: 6/10  Cancels/No Shows: 0/0    Subjective:   Pt  notes he had a decent day yesterday. He notes this morning has been sore in the tailbone area. No radiating pain. Noting compliance with exercises at home.  Has been limiting his walking to lessen pain.     Pain:  [x] Yes  [] No Location: Tailbone region  Pain Rating: (0-10 scale) 4-5/10  Pain altered Tx:  [x] No  [] Yes  Action:  Comments:    Objective:  Modalities:   Precautions: Pain with all movements  Exercises:  INTERVENTIONS  Reps/ Time Weight/ Level Completed  Today Comments               MODALITIES                                    MANUAL             STM to lumbar back   8'      seated leaning forward onto knees T12-L5 paraspinals                EXERCISES            Standing wall calf stretch 30\" ea x2         Seated hamstring stretch 30\" ea x3   X     Seated figure 4  30s ea x2   x     Seated A/P tilt on dynadisc 20x   x Hands on knees working on rounding and then extending (inc pain)    TrA activation  10x3\" hold      TrA marching (B)  10x  ea  x Seated on dynadisc    TrA w/ knee ext 10x ea  x Seated on dynadisc   trAB with overhead press - altn 10x ea 3# x Seated on dynadisc    TrA With UE elevation 10x  #3 x Alt- seated on Dynadisc   Forward flex stretch w/ blue ball 10x5\"  x    Left/right bias forward flex w/ blue ball 10x5\" ea  x    Seated Tband rows/pull downs

## 2024-02-22 ENCOUNTER — OFFICE VISIT (OUTPATIENT)
Age: 69
End: 2024-02-22
Payer: MEDICARE

## 2024-02-22 VITALS — SYSTOLIC BLOOD PRESSURE: 148 MMHG | DIASTOLIC BLOOD PRESSURE: 79 MMHG | HEART RATE: 61 BPM

## 2024-02-22 DIAGNOSIS — M48.062 LUMBAR STENOSIS WITH NEUROGENIC CLAUDICATION: Primary | ICD-10-CM

## 2024-02-22 PROCEDURE — G8417 CALC BMI ABV UP PARAM F/U: HCPCS | Performed by: NEUROLOGICAL SURGERY

## 2024-02-22 PROCEDURE — 3078F DIAST BP <80 MM HG: CPT | Performed by: NEUROLOGICAL SURGERY

## 2024-02-22 PROCEDURE — G8484 FLU IMMUNIZE NO ADMIN: HCPCS | Performed by: NEUROLOGICAL SURGERY

## 2024-02-22 PROCEDURE — 3077F SYST BP >= 140 MM HG: CPT | Performed by: NEUROLOGICAL SURGERY

## 2024-02-22 PROCEDURE — 3017F COLORECTAL CA SCREEN DOC REV: CPT | Performed by: NEUROLOGICAL SURGERY

## 2024-02-22 PROCEDURE — G8427 DOCREV CUR MEDS BY ELIG CLIN: HCPCS | Performed by: NEUROLOGICAL SURGERY

## 2024-02-22 PROCEDURE — 1036F TOBACCO NON-USER: CPT | Performed by: NEUROLOGICAL SURGERY

## 2024-02-22 PROCEDURE — 99214 OFFICE O/P EST MOD 30 MIN: CPT | Performed by: NEUROLOGICAL SURGERY

## 2024-02-22 PROCEDURE — 1123F ACP DISCUSS/DSCN MKR DOCD: CPT | Performed by: NEUROLOGICAL SURGERY

## 2024-02-22 NOTE — PROGRESS NOTES
[facility], [facility], [facility] NEUROSURGERY  [address], [address]  [place] [id number]  Dept: [phone]  Dept Fax: [phone]     Patient:  Abdoulaye Rodriguez  YOB: 1955  Date: 2/22/24      Chief Complaint   Patient presents with    Follow-up     Review imaging            HPI:     Mr. Rodriguez returns to the office today to review the results of a new MRI of the lumbar spine.  He has been having difficulty with back and lower extremity pain.  He had an MRI showing severe stenosis at L2-3 and also what appeared to be a small synovial cyst at L4-5.  The stenosis at L2-3 was felt to be the likely cause of his symptoms.  He also recently underwent surgical treatment of his septic knee, and he completed his antibiotics at the end of December.  I had checked inflammatory markers recently, and these were all within normal limits.  His pain has not changed substantially since he was here last.  I had ordered a new MRI because of worsening back pain and the fact that he had had the infection in the knee.  I was concerned that he may have developed infection in the spine, or that other degenerative changes may have worsened.    Physical Exam:      BP (!) 148/79 (Site: Left Upper Arm, Position: Sitting)   Pulse 61     He is awake, alert, and in no acute distress.  He answers questions appropriately with clear and fluent speech.  His cranial nerves are grossly intact.  His gait remains antalgic, but otherwise stable.    Imaging: I personally reviewed a new MRI of the lumbar spine, as well as the radiologist's report.  This was obtained on February 13 at [facility].  The small synovial cyst that was apparent at L4-5 is no longer visible on the more recent MRI.  The stenosis at L2-3 appears to be a little bit worse, however.  I believe the disc protrusion is a little bit more pronounced.  There is also facet arthropathy and

## 2024-02-23 ENCOUNTER — ANESTHESIA EVENT (OUTPATIENT)
Dept: OPERATING ROOM | Age: 69
End: 2024-02-23
Payer: MEDICARE

## 2024-02-23 ENCOUNTER — HOSPITAL ENCOUNTER (OUTPATIENT)
Dept: PREADMISSION TESTING | Age: 69
End: 2024-02-23
Payer: MEDICARE

## 2024-02-23 VITALS
TEMPERATURE: 98.4 F | WEIGHT: 230 LBS | BODY MASS INDEX: 32.93 KG/M2 | HEART RATE: 57 BPM | DIASTOLIC BLOOD PRESSURE: 77 MMHG | OXYGEN SATURATION: 95 % | SYSTOLIC BLOOD PRESSURE: 145 MMHG | HEIGHT: 70 IN | RESPIRATION RATE: 20 BRPM

## 2024-02-23 DIAGNOSIS — Z01.818 PREOP EXAMINATION: Primary | ICD-10-CM

## 2024-02-23 LAB
ANION GAP SERPL CALCULATED.3IONS-SCNC: 10 MMOL/L (ref 9–17)
BASOPHILS # BLD: 0.1 K/UL (ref 0–0.2)
BASOPHILS NFR BLD: 1 % (ref 0–2)
BUN SERPL-MCNC: 24 MG/DL (ref 8–23)
CALCIUM SERPL-MCNC: 9.6 MG/DL (ref 8.6–10.4)
CHLORIDE SERPL-SCNC: 103 MMOL/L (ref 98–107)
CO2 SERPL-SCNC: 27 MMOL/L (ref 20–31)
CREAT SERPL-MCNC: 1 MG/DL (ref 0.7–1.2)
EKG ATRIAL RATE: 56 BPM
EKG P AXIS: 21 DEGREES
EKG P-R INTERVAL: 184 MS
EKG Q-T INTERVAL: 456 MS
EKG QRS DURATION: 148 MS
EKG QTC CALCULATION (BAZETT): 440 MS
EKG R AXIS: -30 DEGREES
EKG T AXIS: -23 DEGREES
EKG VENTRICULAR RATE: 56 BPM
EOSINOPHIL # BLD: 0.3 K/UL (ref 0–0.4)
EOSINOPHILS RELATIVE PERCENT: 4 % (ref 0–4)
ERYTHROCYTE [DISTWIDTH] IN BLOOD BY AUTOMATED COUNT: 14 % (ref 11.5–14.9)
GFR SERPL CREATININE-BSD FRML MDRD: >60 ML/MIN/1.73M2
GLUCOSE SERPL-MCNC: 82 MG/DL (ref 70–99)
HCT VFR BLD AUTO: 38.3 % (ref 41–53)
HGB BLD-MCNC: 12.6 G/DL (ref 13.5–17.5)
LYMPHOCYTES NFR BLD: 2 K/UL (ref 1–4.8)
LYMPHOCYTES RELATIVE PERCENT: 28 % (ref 24–44)
MCH RBC QN AUTO: 28.6 PG (ref 26–34)
MCHC RBC AUTO-ENTMCNC: 32.8 G/DL (ref 31–37)
MCV RBC AUTO: 87.4 FL (ref 80–100)
MONOCYTES NFR BLD: 0.6 K/UL (ref 0.1–1.3)
MONOCYTES NFR BLD: 8 % (ref 1–7)
NEUTROPHILS NFR BLD: 59 % (ref 36–66)
NEUTS SEG NFR BLD: 4.4 K/UL (ref 1.3–9.1)
PLATELET # BLD AUTO: 230 K/UL (ref 150–450)
PMV BLD AUTO: 7.4 FL (ref 6–12)
POTASSIUM SERPL-SCNC: 3.8 MMOL/L (ref 3.7–5.3)
RBC # BLD AUTO: 4.38 M/UL (ref 4.5–5.9)
SODIUM SERPL-SCNC: 140 MMOL/L (ref 135–144)
WBC OTHER # BLD: 7.4 K/UL (ref 3.5–11)

## 2024-02-23 PROCEDURE — 93010 ELECTROCARDIOGRAM REPORT: CPT | Performed by: INTERNAL MEDICINE

## 2024-02-23 PROCEDURE — 93005 ELECTROCARDIOGRAM TRACING: CPT | Performed by: ANESTHESIOLOGY

## 2024-02-23 PROCEDURE — 36415 COLL VENOUS BLD VENIPUNCTURE: CPT

## 2024-02-23 PROCEDURE — APPSS45 APP SPLIT SHARED TIME 31-45 MINUTES: Performed by: NURSE PRACTITIONER

## 2024-02-23 PROCEDURE — 80048 BASIC METABOLIC PNL TOTAL CA: CPT

## 2024-02-23 PROCEDURE — 85025 COMPLETE CBC W/AUTO DIFF WBC: CPT

## 2024-02-23 RX ORDER — ACETAMINOPHEN 325 MG/1
650 TABLET ORAL EVERY 6 HOURS PRN
COMMUNITY

## 2024-02-23 ASSESSMENT — ENCOUNTER SYMPTOMS
RESPIRATORY NEGATIVE: 1
GASTROINTESTINAL NEGATIVE: 1
ROS SKIN COMMENTS: PSORIASIS
BACK PAIN: 1

## 2024-02-23 NOTE — H&P (VIEW-ONLY)
HISTORY and PHYSICAL  [facility]       NAME:  Abdoulaye Rodriguez  MRN: [id number]   YOB: 1955   Date: 2/23/2024   Age: 68 y.o.  Gender: male     COMPLAINT AND PRESENT HISTORY:   Abdoulaye Rodriguez is 68 y.o.,  male, presents for pre-anesthesia/admission testing for LUMBAR LAMINECTOMY DISCECTOMY POSTERIOR/MICRODISCECTOMY L2-L3 per Dr. etienne.    Primary dx: Spinal stenosis of lumbar region, unspecified whether neurogenic claudication present [M48.061].    HPI:  See portion of the note below per Dr Etienne from office visit on 2/8/24  Mr. Rodriguez presents to the office for further evaluation of difficulty with pain in the lower back radiating into the lower extremities. He was last seen on January 23. He was due for follow-up in a couple of weeks, but his pain has been worsening significantly since he was here. He also was treated for a septic joint in the right knee. His antibiotics ended around the end of December. I had him go for a CRP, sedimentation rate, basic metabolic profile, and complete blood count at his previous appointment. The inflammatory markers were all within normal limits and his white count was normal.     Update HPI 2/23/24  Abdoulaye Rodriguez is 68 y.o.,  male,  C/O of constant shooting pain in the lumbar area . Pain started two years ago  with limited ROM, then the pain get worse recently . Lower back pain  radiates to the lower limbs worse on the Lt side. Pain rated  7-8/10. No recent falls or trauma. Pt has psoriasis on his  lower back   Associated symptoms tightness in his bilateral  thigh/calf muscles. Pt denies numbness or tingling in his legs. Pain aggravated by walking or standing , bending over. Climbing the stairs.Treatment use Percocet , Neurontin.  Pt had PT done for 2-3 weeks with no improvement .  Pt had one time steroid injection per his PCP last October. Without any improvement.Pt denies any bowel or bladder control loss.   Pt denies fever/chills, chest

## 2024-02-23 NOTE — H&P
HISTORY and PHYSICAL  [facility]       NAME:  Abdoulaye Rodriguez  MRN: [id number]   YOB: 1955   Date: 2/23/2024   Age: 68 y.o.  Gender: male     COMPLAINT AND PRESENT HISTORY:   Abdoulaye Rodriguez is 68 y.o.,  male, presents for pre-anesthesia/admission testing for LUMBAR LAMINECTOMY DISCECTOMY POSTERIOR/MICRODISCECTOMY L2-L3 per Dr. etienne.    Primary dx: Spinal stenosis of lumbar region, unspecified whether neurogenic claudication present [M48.061].    HPI:  See portion of the note below per Dr Etienne from office visit on 2/8/24  Mr. Rodriguez presents to the office for further evaluation of difficulty with pain in the lower back radiating into the lower extremities. He was last seen on January 23. He was due for follow-up in a couple of weeks, but his pain has been worsening significantly since he was here. He also was treated for a septic joint in the right knee. His antibiotics ended around the end of December. I had him go for a CRP, sedimentation rate, basic metabolic profile, and complete blood count at his previous appointment. The inflammatory markers were all within normal limits and his white count was normal.     Update HPI 2/23/24  Abdoulaye Rodriguez is 68 y.o.,  male,  C/O of constant shooting pain in the lumbar area . Pain started two years ago  with limited ROM, then the pain get worse recently . Lower back pain  radiates to the lower limbs worse on the Lt side. Pain rated  7-8/10. No recent falls or trauma. Pt has psoriasis on his  lower back   Associated symptoms tightness in his bilateral  thigh/calf muscles. Pt denies numbness or tingling in his legs. Pain aggravated by walking or standing , bending over. Climbing the stairs.Treatment use Percocet , Neurontin.  Pt had PT done for 2-3 weeks with no improvement .  Pt had one time steroid injection per his PCP last October. Without any improvement.Pt denies any bowel or bladder control loss.   Pt denies fever/chills, chest  is alert and oriented to person, place, and time.      Gait: Gait abnormal.      Comments: Pt using a cane for ambulation    Psychiatric:         Mood and Affect: Mood normal.         Behavior: Behavior normal.         LAB REVIEW     Lab Results   Component Value Date    WBC 7.4 02/23/2024    HGB 12.6 (L) 02/23/2024    HCT 38.3 (L) 02/23/2024    MCV 87.4 02/23/2024     02/23/2024     Lab Results   Component Value Date/Time     02/23/2024 02:16 PM    K 3.8 02/23/2024 02:16 PM     02/23/2024 02:16 PM    CO2 27 02/23/2024 02:16 PM    BUN 24 02/23/2024 02:16 PM    CREATININE 1.0 02/23/2024 02:16 PM    GLUCOSE 82 02/23/2024 02:16 PM    GLUCOSE 165 04/10/2023 04:17 PM    CALCIUM 9.6 02/23/2024 02:16 PM    LABGLOM >60 02/23/2024 02:16 PM        PRELIMINARY EKG REVIEW, DATE: 2/23/24     Sinus bradycardia  Left axis deviation   Right bundle branch  block  Moderate voltage criteria for LVH, may be normal variant  Abnormal ECG     SURGERY / PROVISIONAL DIAGNOSES:      LUMBAR LAMINECTOMY DISCECTOMY POSTERIOR/MICRODISCECTOMY L2-L3    Spinal stenosis of lumbar region, unspecified whether neurogenic claudication present [M48.061]    Patient Active Problem List    Diagnosis Date Noted    Preop examination 02/23/2024    Acute medial meniscus tear of right knee 11/15/2023    Septic arthritis (HCC) 11/14/2023    Essential hypertension 02/08/2016    Type 2 diabetes mellitus (HCC) 02/08/2016    Cataract of right eye following rupture of capsule 02/05/2016           CLEARANCE:   Dr. Martinez, anesthesia, was contacted and informed of patient's history and planned surgery.  Medical  clearance required for scheduled surgery d/t uncontrolled BP and abnormal ECG    Dr. Street's office who will be responsible for making sure the clearance is obtained and is in the chart for surgery.      Total time spent on encounter- PAT provider minutes: 31-40 minutes     SÁNCHEZ Samuels CNP on 2/23/2024 at 3:46 PM

## 2024-02-26 ENCOUNTER — HOSPITAL ENCOUNTER (OUTPATIENT)
Dept: PHYSICAL THERAPY | Age: 69
Setting detail: THERAPIES SERIES
Discharge: HOME | End: 2024-02-26
Attending: NEUROLOGICAL SURGERY
Payer: MEDICARE

## 2024-02-26 PROCEDURE — 97110 THERAPEUTIC EXERCISES: CPT

## 2024-02-26 NOTE — FLOWSHEET NOTE
[x] [facility]   Outpatient Rehabilitation & Therapy  [address]  P: [phone]   F: [phone]    Physical Therapy Daily Treatment Note    Date:  2024  Patient Name:  Abdoulaye Rodriguez    :  1955  MRN: [id number]  Physician: James Street MD                      Insurance: Medicare/[facility] AARP -- based on [place]   Medical Diagnosis: M48.062 (ICD-10-CM) - Lumbar stenosis with neurogenic claudication      Rehab Codes: R25 pain , M25.60 stiffness, R53.1 weakness   Onset Date: 23                 Next 's appt: 3/6/24 surgery   Visit# / total visits: 7/10  Cancels/No Shows: 0/0    Subjective:   Pt notes increased muscle soreness and tightness in left lateral thigh.  States did some walking and spend more time on his feet earlier this morning and is feeling it.  States feels like muscle soreness vs nerve pain.  States still only able to stand for 2-3 minutes before pain is unbearable and is required to sit.  Reports still having to sleep in recliner at this time.  Continues to reports limiting standing to keep pain down when he can.  States LEs still feels weak at times, no change in pain during ADLs and better tolerance to getting in and out of the chair.      Pain:  [x] Yes  [] No Location: Left buttocks down lateral thigh   Pain Rating: (0-10 scale) 7/10  Pain altered Tx:  [x] No  [] Yes  Action:  Comments:    Objective:  Modalities:   Precautions: Pain with all movements  Exercises:  INTERVENTIONS  Reps/ Time Weight/ Level Completed  Today Comments               MODALITIES                                    MANUAL             STM to lumbar back   8'      seated leaning forward onto knees T12-L5 paraspinals                EXERCISES            Standing wall calf stretch 30\" ea x2         Seated hamstring stretch 30\" ea x3   X     Seated figure 4  30s ea x2   x     Seated A/P tilt on dynadisc 20x   x Hands on knees working on rounding and then extending (inc pain)    TrA

## 2024-03-05 ENCOUNTER — TELEPHONE (OUTPATIENT)
Age: 69
End: 2024-03-05

## 2024-03-05 NOTE — TELEPHONE ENCOUNTER
Spoke with patient informed him his surgery for 3/6 was canceled due to Dr. Street reporting for Jury Duty, I was able to reschedule him to 3/13 at 2:45pm arrival 12:45pm at [facility], booked with [facility]. The patient and his wife are both aware.

## 2024-03-05 NOTE — PRE-PROCEDURE INSTRUCTIONS
No answer, left message ?                             Unable to leave message ?    When were you told to arrive at hospital ?  -1300    Do you have a  ?-YES    Are you on any blood thinners ?-ASA                     If yes when did you stop taking ?-1 WEEK AGO    Do you have your prep Rx filled and instruction ?  -N/A    Nothing to eat the day before , only clear liquids.-N/A    Are you experiencing any covid symptoms ? -NONE    Do you have any infections or rash we should be aware of ?-NONE      Do you have the Hibiclens soap to use the night before and the morning of surgery ?-YES    Nothing to eat or drink after midnight, only a sip of water to take any medication instructed to take the night before.-INSTRUCTED    Wear comfortable clothing, leave any valuables at home, remove any jewelry and body piercing .-INSTRUCTED      *SPOKE WITH PT'S WIFE

## 2024-03-06 ENCOUNTER — ANESTHESIA (OUTPATIENT)
Dept: OPERATING ROOM | Age: 69
End: 2024-03-06
Payer: MEDICARE

## 2024-03-12 NOTE — PRE-PROCEDURE INSTRUCTIONS
No answer, left message ?                             Unable to leave message ?    When were you told to arrive at hospital ?      Do you have a  ?    Are you on any blood thinners ?                     If yes when did you stop taking ?    Do you have your prep Rx filled and instruction ?      Nothing to eat the day before , only clear liquids.    Are you experiencing any covid symptoms ?     Do you have any infections or rash we should be aware of ?      Do you have the Hibiclens soap to use the night before and the morning of surgery ?    Nothing to eat or drink after midnight, only a sip of water to take any medication instructed to take the night before.  Wear comfortable clothing, leave any valuables at home, remove any jewelry and body piercing .     NO ANSWER. VOICEMAIL LEFT INCLUDING ARRIVAL TIME, NPO AFTER MIDNIGHT,  NEEDED, AND CALL BACK NUMBER.

## 2024-03-13 ENCOUNTER — HOSPITAL ENCOUNTER (OUTPATIENT)
Age: 69
Setting detail: OUTPATIENT SURGERY
Discharge: HOME | End: 2024-03-13
Attending: NEUROLOGICAL SURGERY | Admitting: NEUROLOGICAL SURGERY
Payer: MEDICARE

## 2024-03-13 ENCOUNTER — APPOINTMENT (OUTPATIENT)
Dept: GENERAL RADIOLOGY | Age: 69
End: 2024-03-13
Attending: NEUROLOGICAL SURGERY
Payer: MEDICARE

## 2024-03-13 VITALS
RESPIRATION RATE: 16 BRPM | OXYGEN SATURATION: 96 % | BODY MASS INDEX: 32.93 KG/M2 | TEMPERATURE: 96.8 F | DIASTOLIC BLOOD PRESSURE: 52 MMHG | SYSTOLIC BLOOD PRESSURE: 131 MMHG | HEIGHT: 70 IN | WEIGHT: 230 LBS | HEART RATE: 62 BPM

## 2024-03-13 DIAGNOSIS — G89.18 ACUTE POSTOPERATIVE PAIN: Primary | ICD-10-CM

## 2024-03-13 PROBLEM — M48.061 SPINAL STENOSIS OF LUMBAR REGION: Status: ACTIVE | Noted: 2024-03-13

## 2024-03-13 LAB
GLUCOSE BLD-MCNC: 109 MG/DL (ref 75–110)
GLUCOSE BLD-MCNC: 110 MG/DL (ref 75–110)

## 2024-03-13 PROCEDURE — 2500000003 HC RX 250 WO HCPCS: Performed by: NEUROLOGICAL SURGERY

## 2024-03-13 PROCEDURE — 6360000002 HC RX W HCPCS: Performed by: NURSE ANESTHETIST, CERTIFIED REGISTERED

## 2024-03-13 PROCEDURE — 2580000003 HC RX 258: Performed by: ANESTHESIOLOGY

## 2024-03-13 PROCEDURE — 3600000003 HC SURGERY LEVEL 3 BASE: Performed by: NEUROLOGICAL SURGERY

## 2024-03-13 PROCEDURE — 7100000000 HC PACU RECOVERY - FIRST 15 MIN: Performed by: NEUROLOGICAL SURGERY

## 2024-03-13 PROCEDURE — 3700000000 HC ANESTHESIA ATTENDED CARE: Performed by: NEUROLOGICAL SURGERY

## 2024-03-13 PROCEDURE — 6360000002 HC RX W HCPCS: Performed by: ANESTHESIOLOGY

## 2024-03-13 PROCEDURE — 7100000001 HC PACU RECOVERY - ADDTL 15 MIN: Performed by: NEUROLOGICAL SURGERY

## 2024-03-13 PROCEDURE — 3700000001 HC ADD 15 MINUTES (ANESTHESIA): Performed by: NEUROLOGICAL SURGERY

## 2024-03-13 PROCEDURE — 82947 ASSAY GLUCOSE BLOOD QUANT: CPT

## 2024-03-13 PROCEDURE — 6370000000 HC RX 637 (ALT 250 FOR IP): Performed by: ANESTHESIOLOGY

## 2024-03-13 PROCEDURE — 2500000003 HC RX 250 WO HCPCS: Performed by: NURSE ANESTHETIST, CERTIFIED REGISTERED

## 2024-03-13 PROCEDURE — 7100000030 HC ASPR PHASE II RECOVERY - FIRST 15 MIN: Performed by: NEUROLOGICAL SURGERY

## 2024-03-13 PROCEDURE — 7100000010 HC PHASE II RECOVERY - FIRST 15 MIN: Performed by: NEUROLOGICAL SURGERY

## 2024-03-13 PROCEDURE — 3600000013 HC SURGERY LEVEL 3 ADDTL 15MIN: Performed by: NEUROLOGICAL SURGERY

## 2024-03-13 PROCEDURE — 2709999900 HC NON-CHARGEABLE SUPPLY: Performed by: NEUROLOGICAL SURGERY

## 2024-03-13 PROCEDURE — 2500000003 HC RX 250 WO HCPCS: Performed by: ANESTHESIOLOGY

## 2024-03-13 PROCEDURE — 6360000002 HC RX W HCPCS: Performed by: NEUROLOGICAL SURGERY

## 2024-03-13 PROCEDURE — 7100000011 HC PHASE II RECOVERY - ADDTL 15 MIN: Performed by: NEUROLOGICAL SURGERY

## 2024-03-13 PROCEDURE — 7100000031 HC ASPR PHASE II RECOVERY - ADDTL 15 MIN: Performed by: NEUROLOGICAL SURGERY

## 2024-03-13 PROCEDURE — 2720000010 HC SURG SUPPLY STERILE: Performed by: NEUROLOGICAL SURGERY

## 2024-03-13 RX ORDER — SODIUM CHLORIDE 0.9 % (FLUSH) 0.9 %
5-40 SYRINGE (ML) INJECTION EVERY 12 HOURS SCHEDULED
Status: DISCONTINUED | OUTPATIENT
Start: 2024-03-13 | End: 2024-03-13 | Stop reason: HOSPADM

## 2024-03-13 RX ORDER — FENTANYL CITRATE 50 UG/ML
INJECTION, SOLUTION INTRAMUSCULAR; INTRAVENOUS PRN
Status: DISCONTINUED | OUTPATIENT
Start: 2024-03-13 | End: 2024-03-13 | Stop reason: SDUPTHER

## 2024-03-13 RX ORDER — ROCURONIUM BROMIDE 10 MG/ML
INJECTION, SOLUTION INTRAVENOUS PRN
Status: DISCONTINUED | OUTPATIENT
Start: 2024-03-13 | End: 2024-03-13 | Stop reason: SDUPTHER

## 2024-03-13 RX ORDER — DIPHENHYDRAMINE HYDROCHLORIDE 50 MG/ML
12.5 INJECTION INTRAMUSCULAR; INTRAVENOUS
Status: DISCONTINUED | OUTPATIENT
Start: 2024-03-13 | End: 2024-03-13 | Stop reason: HOSPADM

## 2024-03-13 RX ORDER — LIDOCAINE HYDROCHLORIDE 20 MG/ML
INJECTION, SOLUTION EPIDURAL; INFILTRATION; INTRACAUDAL; PERINEURAL PRN
Status: DISCONTINUED | OUTPATIENT
Start: 2024-03-13 | End: 2024-03-13 | Stop reason: SDUPTHER

## 2024-03-13 RX ORDER — ACETAMINOPHEN 500 MG
1000 TABLET ORAL ONCE
Status: COMPLETED | OUTPATIENT
Start: 2024-03-13 | End: 2024-03-13

## 2024-03-13 RX ORDER — GABAPENTIN 100 MG/1
100 CAPSULE ORAL ONCE
Status: COMPLETED | OUTPATIENT
Start: 2024-03-13 | End: 2024-03-13

## 2024-03-13 RX ORDER — NALOXONE HYDROCHLORIDE 0.4 MG/ML
INJECTION, SOLUTION INTRAMUSCULAR; INTRAVENOUS; SUBCUTANEOUS PRN
Status: DISCONTINUED | OUTPATIENT
Start: 2024-03-13 | End: 2024-03-13 | Stop reason: HOSPADM

## 2024-03-13 RX ORDER — ONDANSETRON 2 MG/ML
INJECTION INTRAMUSCULAR; INTRAVENOUS PRN
Status: DISCONTINUED | OUTPATIENT
Start: 2024-03-13 | End: 2024-03-13 | Stop reason: SDUPTHER

## 2024-03-13 RX ORDER — SODIUM CHLORIDE 9 MG/ML
INJECTION, SOLUTION INTRAVENOUS PRN
Status: DISCONTINUED | OUTPATIENT
Start: 2024-03-13 | End: 2024-03-13 | Stop reason: HOSPADM

## 2024-03-13 RX ORDER — ONDANSETRON 2 MG/ML
4 INJECTION INTRAMUSCULAR; INTRAVENOUS
Status: DISCONTINUED | OUTPATIENT
Start: 2024-03-13 | End: 2024-03-13 | Stop reason: HOSPADM

## 2024-03-13 RX ORDER — GLYCOPYRROLATE 0.2 MG/ML
INJECTION INTRAMUSCULAR; INTRAVENOUS PRN
Status: DISCONTINUED | OUTPATIENT
Start: 2024-03-13 | End: 2024-03-13 | Stop reason: SDUPTHER

## 2024-03-13 RX ORDER — SODIUM CHLORIDE 0.9 % (FLUSH) 0.9 %
5-40 SYRINGE (ML) INJECTION PRN
Status: DISCONTINUED | OUTPATIENT
Start: 2024-03-13 | End: 2024-03-13 | Stop reason: HOSPADM

## 2024-03-13 RX ORDER — PROPOFOL 10 MG/ML
INJECTION, EMULSION INTRAVENOUS PRN
Status: DISCONTINUED | OUTPATIENT
Start: 2024-03-13 | End: 2024-03-13 | Stop reason: SDUPTHER

## 2024-03-13 RX ORDER — TIZANIDINE 2 MG/1
2 TABLET ORAL EVERY 8 HOURS PRN
Qty: 30 TABLET | Refills: 2 | Status: SHIPPED | OUTPATIENT
Start: 2024-03-13

## 2024-03-13 RX ORDER — LIDOCAINE HYDROCHLORIDE 10 MG/ML
1 INJECTION, SOLUTION EPIDURAL; INFILTRATION; INTRACAUDAL; PERINEURAL
Status: COMPLETED | OUTPATIENT
Start: 2024-03-13 | End: 2024-03-13

## 2024-03-13 RX ORDER — DEXAMETHASONE SODIUM PHOSPHATE 4 MG/ML
INJECTION, SOLUTION INTRA-ARTICULAR; INTRALESIONAL; INTRAMUSCULAR; INTRAVENOUS; SOFT TISSUE PRN
Status: DISCONTINUED | OUTPATIENT
Start: 2024-03-13 | End: 2024-03-13 | Stop reason: SDUPTHER

## 2024-03-13 RX ORDER — OXYCODONE HYDROCHLORIDE AND ACETAMINOPHEN 5; 325 MG/1; MG/1
1 TABLET ORAL EVERY 6 HOURS PRN
Qty: 28 TABLET | Refills: 0 | Status: SHIPPED | OUTPATIENT
Start: 2024-03-13 | End: 2024-03-20

## 2024-03-13 RX ORDER — MIDAZOLAM HYDROCHLORIDE 1 MG/ML
INJECTION INTRAMUSCULAR; INTRAVENOUS PRN
Status: DISCONTINUED | OUTPATIENT
Start: 2024-03-13 | End: 2024-03-13 | Stop reason: SDUPTHER

## 2024-03-13 RX ORDER — SODIUM CHLORIDE 9 MG/ML
INJECTION, SOLUTION INTRAVENOUS CONTINUOUS
Status: DISCONTINUED | OUTPATIENT
Start: 2024-03-13 | End: 2024-03-13 | Stop reason: HOSPADM

## 2024-03-13 RX ORDER — BUPIVACAINE HYDROCHLORIDE AND EPINEPHRINE 5; 5 MG/ML; UG/ML
INJECTION, SOLUTION EPIDURAL; INTRACAUDAL; PERINEURAL PRN
Status: DISCONTINUED | OUTPATIENT
Start: 2024-03-13 | End: 2024-03-13 | Stop reason: ALTCHOICE

## 2024-03-13 RX ORDER — EPHEDRINE SULFATE/0.9% NACL/PF 25 MG/5 ML
SYRINGE (ML) INTRAVENOUS PRN
Status: DISCONTINUED | OUTPATIENT
Start: 2024-03-13 | End: 2024-03-13 | Stop reason: SDUPTHER

## 2024-03-13 RX ADMIN — EPHEDRINE SULFATE 5 MG: 5 INJECTION INTRAVENOUS at 16:19

## 2024-03-13 RX ADMIN — DEXAMETHASONE SODIUM PHOSPHATE 10 MG: 4 INJECTION INTRA-ARTICULAR; INTRALESIONAL; INTRAMUSCULAR; INTRAVENOUS; SOFT TISSUE at 15:25

## 2024-03-13 RX ADMIN — EPHEDRINE SULFATE 5 MG: 5 INJECTION INTRAVENOUS at 15:52

## 2024-03-13 RX ADMIN — LIDOCAINE HYDROCHLORIDE 40 MG: 20 INJECTION, SOLUTION EPIDURAL; INFILTRATION; INTRACAUDAL; PERINEURAL at 14:51

## 2024-03-13 RX ADMIN — ROCURONIUM BROMIDE 50 MG: 10 INJECTION, SOLUTION INTRAVENOUS at 15:25

## 2024-03-13 RX ADMIN — LIDOCAINE HYDROCHLORIDE 1 ML: 10 INJECTION, SOLUTION EPIDURAL; INFILTRATION; INTRACAUDAL; PERINEURAL at 13:45

## 2024-03-13 RX ADMIN — ROCURONIUM BROMIDE 50 MG: 10 INJECTION, SOLUTION INTRAVENOUS at 14:53

## 2024-03-13 RX ADMIN — ONDANSETRON 4 MG: 2 INJECTION INTRAMUSCULAR; INTRAVENOUS at 16:46

## 2024-03-13 RX ADMIN — FENTANYL CITRATE 50 MCG: 50 INJECTION, SOLUTION INTRAMUSCULAR; INTRAVENOUS at 15:25

## 2024-03-13 RX ADMIN — FENTANYL CITRATE 50 MCG: 50 INJECTION, SOLUTION INTRAMUSCULAR; INTRAVENOUS at 14:51

## 2024-03-13 RX ADMIN — EPHEDRINE SULFATE 10 MG: 5 INJECTION INTRAVENOUS at 15:55

## 2024-03-13 RX ADMIN — SUGAMMADEX 300 MG: 100 INJECTION, SOLUTION INTRAVENOUS at 16:46

## 2024-03-13 RX ADMIN — GLYCOPYRROLATE 0.2 MG: 0.2 INJECTION INTRAMUSCULAR; INTRAVENOUS at 15:47

## 2024-03-13 RX ADMIN — PROPOFOL 200 MG: 10 INJECTION, EMULSION INTRAVENOUS at 14:52

## 2024-03-13 RX ADMIN — SODIUM CHLORIDE: 9 INJECTION, SOLUTION INTRAVENOUS at 13:46

## 2024-03-13 RX ADMIN — GABAPENTIN 100 MG: 100 CAPSULE ORAL at 13:29

## 2024-03-13 RX ADMIN — HYDROMORPHONE HYDROCHLORIDE 0.5 MG: 1 INJECTION, SOLUTION INTRAMUSCULAR; INTRAVENOUS; SUBCUTANEOUS at 17:50

## 2024-03-13 RX ADMIN — EPHEDRINE SULFATE 5 MG: 5 INJECTION INTRAVENOUS at 16:17

## 2024-03-13 RX ADMIN — MIDAZOLAM 2 MG: 1 INJECTION INTRAMUSCULAR; INTRAVENOUS at 14:53

## 2024-03-13 RX ADMIN — Medication 2000 MG: at 15:00

## 2024-03-13 RX ADMIN — ACETAMINOPHEN 1000 MG: 500 TABLET ORAL at 13:29

## 2024-03-13 ASSESSMENT — PAIN DESCRIPTION - LOCATION
LOCATION: BACK
LOCATION: BACK

## 2024-03-13 ASSESSMENT — PAIN DESCRIPTION - DESCRIPTORS
DESCRIPTORS: ACHING

## 2024-03-13 ASSESSMENT — PAIN DESCRIPTION - PAIN TYPE
TYPE: SURGICAL PAIN
TYPE: SURGICAL PAIN

## 2024-03-13 ASSESSMENT — PAIN - FUNCTIONAL ASSESSMENT
PAIN_FUNCTIONAL_ASSESSMENT: 0-10

## 2024-03-13 ASSESSMENT — PAIN DESCRIPTION - ORIENTATION
ORIENTATION: LOWER
ORIENTATION: LOWER

## 2024-03-13 ASSESSMENT — PAIN SCALES - GENERAL
PAINLEVEL_OUTOF10: 5
PAINLEVEL_OUTOF10: 7

## 2024-03-13 NOTE — DISCHARGE INSTR - MEDS
You may resume aspirin on 3/20.  It is okay to use over-the-counter NSAIDs such as ibuprofen or naproxen starting now to help with pain relief.  It is okay to take these medications in addition to the prescription pain medication (Percocet).  Percocet contains acetaminophen (Tylenol), so be cautious about using acetaminophen together with Percocet.

## 2024-03-13 NOTE — INTERVAL H&P NOTE
Update History & Physical    The patient's History and Physical of February 23, 2024 was reviewed with the patient and I examined the patient. There was no change. The surgical site was confirmed by the patient and me.     Pt undergoing LUMBAR LAMINECTOMY DISCECTOMY POSTERIOR L2-L3 with Dr Street for Spinal stenosis of lumbar region, unspecified whether neurogenic claudication present    Pt denies fever/chills, chest pain or SOB  Pt NPO since [place], Pt took Inderal this am with small sip of water.  Pt denies hx MRSA  Pt denies hx of blood clots Lungs/legs  Physical exam remains unchanged including cardiac and pulmonary assessment  See nursing flow sheet for vital signs     Medical clearance in chart per Dr Kim PCP    Electronically signed by SÁNCHEZ Payne CNP on 3/13/2024 at 12:49 PM

## 2024-03-13 NOTE — ANESTHESIA PRE PROCEDURE
Postoperative opioids intended and Prophylactic antiemetics administered.  Anesthetic plan and risks discussed with patient.      Plan discussed with CRNA.                    Yadira Joseph MD   3/13/2024

## 2024-03-13 NOTE — ANESTHESIA POSTPROCEDURE EVALUATION
Department of Anesthesiology  Postprocedure Note    Patient: Abdoulaye Rodriguez  MRN: [id number]  YOB: 1955  Date of evaluation: 3/13/2024    Procedure Summary       Date: 03/13/24 Room / Location: [address]    Anesthesia Start: 1446 Anesthesia Stop: 1711    Procedure: LUMBAR LAMINECTOMY POSTERIOR L2-L3, BILATERAL DISCECTOMY (Back) Diagnosis:       Spinal stenosis of lumbar region, unspecified whether neurogenic claudication present      (Spinal stenosis of lumbar region, unspecified whether neurogenic claudication present [M48.061])    Surgeons: James Street MD Responsible Provider: Ciaran Dalton MD    Anesthesia Type: general ASA Status: 3            Anesthesia Type: No value filed.    Mike Phase I: Mike Score: 8    Mike Phase II:      Anesthesia Post Evaluation    Patient location during evaluation: PACU  Patient participation: complete - patient participated  Level of consciousness: awake and alert  Airway patency: patent  Nausea & Vomiting: no vomiting  Cardiovascular status: hemodynamically stable  Respiratory status: acceptable  Hydration status: euvolemic  Comments: POST- ANESTHESIA EVALUATION       Pt Name: Abdoulaye Rodriguez  MRN: [id number]  YOB: 1955  Date of evaluation: 3/13/2024  Time:  5:57 PM      BP (!) 116/55   Pulse 67   Temp 96.8 °F (36 °C) (Infrared)   Resp 12   Ht 1.778 m (5' 10\")   Wt 104.3 kg (230 lb)   SpO2 95%   BMI 33.00 kg/m²      Consciousness Level  Awake  Cardiopulmonary Status  Stable  Pain Adequately Treated YES  Nausea / Vomiting  NO  Adequate Hydration  YES  Anesthesia Related Complications NONE      Electronically signed by Ciaran Dalton MD on 3/13/2024 at 5:57 PM         Pain management: satisfactory to patient    No notable events documented.

## 2024-03-13 NOTE — DISCHARGE INSTRUCTIONS
driving.    Please call for any signs of infection (redness, drainage, opening of the incision), fevers >101F, neurologic changes (new weakness, numbness), pain unresponsive to medication, or with any questions or concerns.  The office phone number is [phone].

## 2024-03-13 NOTE — OP NOTE
Operative Note      Patient: Abdoulaye Rodriguez  YOB: 1955  MRN: [id number]    Date of Procedure: 3/13/2024    Pre-Op Diagnosis Codes:     * Spinal stenosis of lumbar region, unspecified whether neurogenic claudication present [M48.061]    Post-Op Diagnosis: Same       Procedure(s):  LUMBAR LAMINECTOMY POSTERIOR L2-L3, BILATERAL DISCECTOMY    Surgeon(s):  James Street MD    Assistant:   * No surgical staff found *    Anesthesia: General    Estimated Blood Loss (mL): 20    Complications: None    Specimens:   * No specimens in log *    Implants:  * No implants in log *      Drains: * No LDAs found *    Findings: Severe stenosis at L2-3 due to facet arthropathy, ligamentous thickening, and a broad-based disc protrusion.    Indications for Procedure:  The patient is a 68-year-old gentleman who has been having difficulty with pain in the lower back radiating into the lower extremities.  This pain has been going on for some time.  He has tried conservative measures without relief.  He had some difficulty with a septic knee that delayed surgery.  He underwent a recent MRI to be sure that there was no evidence of infection in the lumbar spine.  The imaging continue to show severe stenosis at L2-3, which was due to a combination of factors, including facet arthropathy, ligamentous thickening, and a broad-based disc protrusion with what appeared to be a small focal herniation superimposed.  His pain had been worsening, and he was felt to be a good candidate for surgical decompression.  The risks, benefits, and alternatives to this approach were discussed with the patient at length.  After having an opportunity to have his questions answered he provided his consent to proceed with surgery.    Detailed Description of Procedure:   The patient was taken to the operating room and general endotracheal anesthesia was successfully induced by the anesthesia service without incident.  He was positioned in a prone position

## 2024-03-24 PROBLEM — Z01.818 PREOP EXAMINATION: Status: RESOLVED | Noted: 2024-02-23 | Resolved: 2024-03-24

## 2024-03-26 ENCOUNTER — OFFICE VISIT (OUTPATIENT)
Age: 69
End: 2024-03-26

## 2024-03-26 VITALS — DIASTOLIC BLOOD PRESSURE: 75 MMHG | SYSTOLIC BLOOD PRESSURE: 139 MMHG | HEART RATE: 64 BPM

## 2024-03-26 DIAGNOSIS — M48.062 LUMBAR STENOSIS WITH NEUROGENIC CLAUDICATION: Primary | ICD-10-CM

## 2024-03-26 PROCEDURE — 99024 POSTOP FOLLOW-UP VISIT: CPT | Performed by: NEUROLOGICAL SURGERY

## 2024-03-26 NOTE — PROGRESS NOTES
[facility], [facility], [facility] NEUROSURGERY  [address], [address]  [place] [address]  Dept: [phone]  Dept Fax: [phone]     Patient:  Abdoulaye Rodriguez  YOB: 1955  Date: 3/26/24      Chief Complaint   Patient presents with    Post-Op Check     B/L L2-3 posterior LAMI w/ discectomy 3/13/2024           HPI:     Mr. Rodriguez returns to the office today for follow-up about 2 weeks out from an L2-3 laminectomy and discectomy.  It sounds as if he is doing very well at home.  He has not had difficulty with pain or jolts into the lower extremities like he was having prior to surgery.  He denies any difficulty with the incision.  He has not been taking pain medication for a couple of weeks.      Physical Exam:      /75 (Site: Left Upper Arm, Position: Sitting)   Pulse 64     He is awake, alert, and in no acute distress.  He answers questions appropriately with clear and fluent speech.  His cranial nerves are grossly intact.  He is moving well without focal deficit.  The incision is healing well without erythema or induration.    Assessment and Plan:     1. Lumbar stenosis with neurogenic claudication        Mr. Rodriguez is off to a good start after undergoing an L2-3 laminectomy and discectomy about 2 weeks ago.  He was encouraged to continue with his activity restrictions.  I will see him back in the office in about 1 month to check his progress.  He is welcome to contact me in the meantime with any questions or concerns.      Electronically signed by James Street MD on 3/26/2024 at 9:21 AM    Please note that this chart was generated using voice recognition Dragon dictation software.  Although every effort was made to ensure the accuracy of this automated transcription, some errors in transcription may have occurred.

## 2024-04-23 ENCOUNTER — OFFICE VISIT (OUTPATIENT)
Age: 69
End: 2024-04-23

## 2024-04-23 VITALS — HEART RATE: 63 BPM | DIASTOLIC BLOOD PRESSURE: 77 MMHG | SYSTOLIC BLOOD PRESSURE: 159 MMHG

## 2024-04-23 DIAGNOSIS — M48.062 LUMBAR STENOSIS WITH NEUROGENIC CLAUDICATION: Primary | ICD-10-CM

## 2024-04-23 PROCEDURE — 99024 POSTOP FOLLOW-UP VISIT: CPT | Performed by: NEUROLOGICAL SURGERY

## 2024-04-23 RX ORDER — HYDROCHLOROTHIAZIDE 25 MG/1
TABLET ORAL
COMMUNITY
Start: 2024-04-12

## 2024-04-23 RX ORDER — LOSARTAN POTASSIUM 100 MG/1
TABLET ORAL
COMMUNITY
Start: 2024-04-12

## 2024-04-23 NOTE — PROGRESS NOTES
[facility], [facility], [facility] NEUROSURGERY  [address], [address]  [place] [address]  Dept: [phone]  Dept Fax: [phone]     Patient:  Abdoulaye Rodriguez  YOB: 1955  Date: 4/23/24      Chief Complaint   Patient presents with    Post-Op Check     6wk PO L2-3 lami w/disc 3/13/24           HPI:     Mr. Rodriguez returns to the office for further follow-up after undergoing an L2-3 laminectomy and discectomy.  Surgery was on March 13.  He continues to report doing well.  He is very glad that he had the surgery.  He is not having any pain in the lower extremities.  He has some occasional mild discomfort in the lower back, but this seems to be improving.      Physical Exam:      BP (!) 159/77   Pulse 63     He is awake, alert, and in no acute distress.  He answers questions appropriately with clear and fluent speech.  His cranial nerves are grossly intact.  He is moving without gross deficit.  His gait is steady.    Assessment and Plan:     1. Lumbar stenosis with neurogenic claudication        Mr. Rodriguez has done well after an L2-3 laminectomy with bilateral discectomy.  Now that he is 6 weeks out from surgery he is cleared from his activity restrictions.  I did urge him to use caution and gradually ease his way back into more strenuous activity.  I do not see a need for further follow-up at this time, but he is welcome to contact the office with any questions or concerns.      Electronically signed by James Street MD on 4/30/2024 at 8:50 AM    Please note that this chart was generated using voice recognition Dragon dictation software.  Although every effort was made to ensure the accuracy of this automated transcription, some errors in transcription may have occurred.

## (undated) DEVICE — GLOVE SURG SZ 8 L12IN FNGR THK79MIL GRN LTX FREE

## (undated) DEVICE — GLOVE ORANGE PI 7 1/2   MSG9075

## (undated) DEVICE — BLADE ES ELASTOMERIC COAT INSUL DURABLE BEND UPTO 90DEG

## (undated) DEVICE — ST CHARLES DR BEEKS SPINE: Brand: MEDLINE INDUSTRIES, INC.

## (undated) DEVICE — DRESSING,GAUZE,XEROFORM,CURAD,1"X8",ST: Brand: CURAD

## (undated) DEVICE — 1010 S-DRAPE TOWEL DRAPE 10/BX: Brand: STERI-DRAPE™

## (undated) DEVICE — SUTURE VCRL + SZ 2-0 L18IN ABSRB UD CT1 L36MM 1/2 CIR VCP839D

## (undated) DEVICE — NEEDLE HYPO 25GA L1.5IN BLU POLYPR HUB S STL REG BVL STR

## (undated) DEVICE — SUTURE VCRL + SZ 0 L27IN ABSRB UD L36MM CT-1 1/2 CIR VCPP41D

## (undated) DEVICE — SINGLE PORT MANIFOLD: Brand: NEPTUNE 2

## (undated) DEVICE — SOLUTION IRRIG 1000ML STRL H2O USP PLAS POUR BTL

## (undated) DEVICE — ZIMMER® STERILE DISPOSABLE TOURNIQUET CUFF WITH PLC, DUAL PORT, SINGLE BLADDER, 30 IN. (76 CM)

## (undated) DEVICE — SYRINGE MED 10ML LUERLOCK TIP W/O SFTY DISP

## (undated) DEVICE — [TOMCAT CUTTER, ARTHROSCOPIC SHAVER BLADE,  DO NOT RESTERILIZE,  DO NOT USE IF PACKAGE IS DAMAGED,  KEEP DRY,  KEEP AWAY FROM SUNLIGHT]: Brand: FORMULA

## (undated) DEVICE — 3.0MM PRECISION NEURO (MATCH HEAD)

## (undated) DEVICE — MASTISOL ADHESIVE LIQ 2/3ML

## (undated) DEVICE — SUTURE MCRYL + SZ 4-0 L27IN ABSRB UD L19MM PS-2 3/8 CIR MCP426H

## (undated) DEVICE — GLOVE ORTHO 8   MSG9480

## (undated) DEVICE — SOLUTION IRRIG 1000ML 0.9% SOD CHL USP POUR PLAS BTL

## (undated) DEVICE — STRIP,CLOSURE,WOUND,MEDI-STRIP,1/2X4: Brand: MEDLINE

## (undated) DEVICE — BLANKET WRM W40.2XL55.9IN IORT LO BODY + MISTRAL AIR

## (undated) DEVICE — SPONGE,NEURO,0.5"X0.5",XR,STRL,10/PK: Brand: MEDLINE

## (undated) DEVICE — NDL CNTR 40CT FM MAG: Brand: MEDLINE INDUSTRIES, INC.

## (undated) DEVICE — TUBING, SUCTION, 3/16" X 10', STRAIGHT: Brand: MEDLINE

## (undated) DEVICE — AGENT HEMSTAT 1GM PORCINE GEL ABSRB PWD FOR CONT OOZING

## (undated) DEVICE — 4-PORT MANIFOLD: Brand: NEPTUNE 2

## (undated) DEVICE — SUTURE VCRL + SZ 3-0 L18IN ABSRB UD SH 1/2 CIR TAPERCUT NDL VCP864D

## (undated) DEVICE — SOLUTION IV IRRIG LACTATED RINGERS 3000ML 2B7487

## (undated) DEVICE — ST CHARLES KNEE ARTHRO: Brand: MEDLINE INDUSTRIES, INC.